# Patient Record
Sex: MALE | Race: BLACK OR AFRICAN AMERICAN | NOT HISPANIC OR LATINO | Employment: UNEMPLOYED | URBAN - METROPOLITAN AREA
[De-identification: names, ages, dates, MRNs, and addresses within clinical notes are randomized per-mention and may not be internally consistent; named-entity substitution may affect disease eponyms.]

---

## 2021-09-25 ENCOUNTER — APPOINTMENT (OUTPATIENT)
Dept: CT IMAGING | Facility: CLINIC | Age: 56
End: 2021-09-25
Attending: EMERGENCY MEDICINE

## 2021-09-25 ENCOUNTER — APPOINTMENT (OUTPATIENT)
Dept: GENERAL RADIOLOGY | Facility: CLINIC | Age: 56
End: 2021-09-25
Attending: EMERGENCY MEDICINE

## 2021-09-25 ENCOUNTER — HOSPITAL ENCOUNTER (EMERGENCY)
Facility: CLINIC | Age: 56
Discharge: HOME | End: 2021-09-25
Attending: EMERGENCY MEDICINE | Admitting: EMERGENCY MEDICINE

## 2021-09-25 VITALS
HEART RATE: 76 BPM | SYSTOLIC BLOOD PRESSURE: 140 MMHG | RESPIRATION RATE: 16 BRPM | WEIGHT: 315 LBS | OXYGEN SATURATION: 99 % | TEMPERATURE: 97.2 F | DIASTOLIC BLOOD PRESSURE: 93 MMHG | BODY MASS INDEX: 45.33 KG/M2

## 2021-09-25 DIAGNOSIS — I10 HYPERTENSION, POOR CONTROL: ICD-10-CM

## 2021-09-25 LAB
ANION GAP SERPL CALCULATED.3IONS-SCNC: 7 MMOL/L (ref 3–14)
BASOPHILS # BLD AUTO: 0 10E3/UL (ref 0–0.2)
BASOPHILS NFR BLD AUTO: 1 %
BUN SERPL-MCNC: 13 MG/DL (ref 7–30)
CALCIUM SERPL-MCNC: 8.8 MG/DL (ref 8.5–10.1)
CHLORIDE BLD-SCNC: 103 MMOL/L (ref 94–109)
CO2 SERPL-SCNC: 29 MMOL/L (ref 20–32)
CREAT SERPL-MCNC: 1.42 MG/DL (ref 0.66–1.25)
D DIMER PPP FEU-MCNC: 0.81 UG/ML FEU (ref 0–0.5)
EOSINOPHIL # BLD AUTO: 0.1 10E3/UL (ref 0–0.7)
EOSINOPHIL NFR BLD AUTO: 1 %
ERYTHROCYTE [DISTWIDTH] IN BLOOD BY AUTOMATED COUNT: 13.8 % (ref 10–15)
GFR SERPL CREATININE-BSD FRML MDRD: 55 ML/MIN/1.73M2
GLUCOSE BLD-MCNC: 99 MG/DL (ref 70–99)
HCT VFR BLD AUTO: 47.7 % (ref 40–53)
HGB BLD-MCNC: 14.9 G/DL (ref 13.3–17.7)
HOLD SPECIMEN: NORMAL
IMM GRANULOCYTES # BLD: 0 10E3/UL
IMM GRANULOCYTES NFR BLD: 0 %
LYMPHOCYTES # BLD AUTO: 1.8 10E3/UL (ref 0.8–5.3)
LYMPHOCYTES NFR BLD AUTO: 32 %
MCH RBC QN AUTO: 27.4 PG (ref 26.5–33)
MCHC RBC AUTO-ENTMCNC: 31.2 G/DL (ref 31.5–36.5)
MCV RBC AUTO: 88 FL (ref 78–100)
MONOCYTES # BLD AUTO: 0.4 10E3/UL (ref 0–1.3)
MONOCYTES NFR BLD AUTO: 8 %
NEUTROPHILS # BLD AUTO: 3.1 10E3/UL (ref 1.6–8.3)
NEUTROPHILS NFR BLD AUTO: 58 %
NRBC # BLD AUTO: 0 10E3/UL
NRBC BLD AUTO-RTO: 0 /100
PLATELET # BLD AUTO: 248 10E3/UL (ref 150–450)
POTASSIUM BLD-SCNC: 4 MMOL/L (ref 3.4–5.3)
RBC # BLD AUTO: 5.43 10E6/UL (ref 4.4–5.9)
SODIUM SERPL-SCNC: 139 MMOL/L (ref 133–144)
TROPONIN I SERPL-MCNC: <0.015 UG/L (ref 0–0.04)
WBC # BLD AUTO: 5.4 10E3/UL (ref 4–11)

## 2021-09-25 PROCEDURE — 71275 CT ANGIOGRAPHY CHEST: CPT

## 2021-09-25 PROCEDURE — 71046 X-RAY EXAM CHEST 2 VIEWS: CPT

## 2021-09-25 PROCEDURE — 250N000013 HC RX MED GY IP 250 OP 250 PS 637: Performed by: EMERGENCY MEDICINE

## 2021-09-25 PROCEDURE — 250N000011 HC RX IP 250 OP 636: Performed by: EMERGENCY MEDICINE

## 2021-09-25 PROCEDURE — 93005 ELECTROCARDIOGRAM TRACING: CPT

## 2021-09-25 PROCEDURE — 85379 FIBRIN DEGRADATION QUANT: CPT | Performed by: EMERGENCY MEDICINE

## 2021-09-25 PROCEDURE — 85025 COMPLETE CBC W/AUTO DIFF WBC: CPT | Performed by: EMERGENCY MEDICINE

## 2021-09-25 PROCEDURE — 99285 EMERGENCY DEPT VISIT HI MDM: CPT | Mod: 25

## 2021-09-25 PROCEDURE — 84484 ASSAY OF TROPONIN QUANT: CPT | Performed by: EMERGENCY MEDICINE

## 2021-09-25 PROCEDURE — 80048 BASIC METABOLIC PNL TOTAL CA: CPT | Performed by: EMERGENCY MEDICINE

## 2021-09-25 PROCEDURE — 36415 COLL VENOUS BLD VENIPUNCTURE: CPT | Performed by: EMERGENCY MEDICINE

## 2021-09-25 PROCEDURE — 96374 THER/PROPH/DIAG INJ IV PUSH: CPT | Mod: 59

## 2021-09-25 RX ORDER — AMLODIPINE BESYLATE 5 MG/1
5 TABLET ORAL ONCE
Status: COMPLETED | OUTPATIENT
Start: 2021-09-25 | End: 2021-09-25

## 2021-09-25 RX ORDER — ASPIRIN 325 MG
325 TABLET ORAL ONCE
Status: COMPLETED | OUTPATIENT
Start: 2021-09-25 | End: 2021-09-25

## 2021-09-25 RX ORDER — LOSARTAN POTASSIUM 25 MG/1
25 TABLET ORAL DAILY
Qty: 30 TABLET | Refills: 1 | Status: SHIPPED | OUTPATIENT
Start: 2021-09-25 | End: 2022-04-22

## 2021-09-25 RX ORDER — LABETALOL HYDROCHLORIDE 5 MG/ML
20 INJECTION, SOLUTION INTRAVENOUS EVERY 10 MIN PRN
Status: DISCONTINUED | OUTPATIENT
Start: 2021-09-25 | End: 2021-09-25 | Stop reason: HOSPADM

## 2021-09-25 RX ORDER — LISINOPRIL 10 MG/1
10 TABLET ORAL ONCE
Status: COMPLETED | OUTPATIENT
Start: 2021-09-25 | End: 2021-09-25

## 2021-09-25 RX ORDER — IOPAMIDOL 755 MG/ML
100 INJECTION, SOLUTION INTRAVASCULAR ONCE
Status: COMPLETED | OUTPATIENT
Start: 2021-09-25 | End: 2021-09-25

## 2021-09-25 RX ORDER — AMLODIPINE BESYLATE 5 MG/1
5 TABLET ORAL DAILY
Qty: 30 TABLET | Refills: 1 | Status: SHIPPED | OUTPATIENT
Start: 2021-09-25 | End: 2022-04-22

## 2021-09-25 RX ORDER — MECLIZINE HYDROCHLORIDE 25 MG/1
25 TABLET ORAL ONCE
Status: COMPLETED | OUTPATIENT
Start: 2021-09-25 | End: 2021-09-25

## 2021-09-25 RX ADMIN — MECLIZINE HYDROCHLORIDE 25 MG: 25 TABLET ORAL at 08:37

## 2021-09-25 RX ADMIN — IOPAMIDOL 100 ML: 755 INJECTION, SOLUTION INTRAVENOUS at 10:30

## 2021-09-25 RX ADMIN — LISINOPRIL 10 MG: 10 TABLET ORAL at 10:14

## 2021-09-25 RX ADMIN — AMLODIPINE BESYLATE 5 MG: 5 TABLET ORAL at 10:14

## 2021-09-25 RX ADMIN — LABETALOL HYDROCHLORIDE 20 MG: 5 INJECTION, SOLUTION INTRAVENOUS at 11:26

## 2021-09-25 RX ADMIN — ASPIRIN 325 MG ORAL TABLET 325 MG: 325 PILL ORAL at 08:37

## 2021-09-25 ASSESSMENT — ENCOUNTER SYMPTOMS
RHINORRHEA: 0
PALPITATIONS: 1
DIZZINESS: 1
FEVER: 0
COUGH: 0
CHEST TIGHTNESS: 1

## 2021-09-25 NOTE — ED PROVIDER NOTES
History   Chief Complaint:  Chest Pain and Dizziness       The history is provided by the patient.      Efraín Estrada is a 55 year old male with a history of hypertension and hyperlipidemia who presents with chest discomfort patient describes as a stuffiness that began last night. Denies having any outright chest pain. The patient also had symptoms of dizziness that he likened to vertigo, particularly when he went to sit or stand up. His dizziness persisted all through last night though is resolved here, does continue to have chest stuffiness. He is not in as much discomfort here at rest though notes that symptoms are exacerbated with movement. The patient reports that he was seen in the ED back in November 2015 for dizziness and chest congestion. His symptoms at that time were very similar to today's presentation though were more severe, he also felt very clammy and had elevated BP. He was started on both lisinopril and meclizine at that time but notes that he is no longer taking either medication. No current regular medications. Patient does record BP at home, has been running  ~145/. He has known history of hypertension but does not regularly follow with a provider for this. Patient is also concerned that he has been feeling the sensation of palpitations over the last few months. No family history of heart problems that he is aware. No cough, rhinorrhea, or fever. Patient does not currently smoke tobacco though did recently quit as of ~2 weeks ago.    Review of Systems   Constitutional: Negative for fever.   HENT: Negative for rhinorrhea.    Respiratory: Positive for chest tightness (stuffiness). Negative for cough.    Cardiovascular: Positive for palpitations (per patient). Negative for chest pain.   Neurological: Positive for dizziness (since resolved).   All other systems reviewed and are negative.    Allergies:  Codeine    Medications:  The patient is not currently taking any regular  medications    Past Medical History:    Hypertension  Hyperlipidemia     Past Surgical History:    Tibia fracture repair with plate placement  Plate removal from tibia  Tonsillectomy    Family History:    Father - stomach cancer  Mother - ovarian cancer    Social History:  The patient was not accompanied to the ED.  Smoking Status: Former smoker, quit ~ 2 weeks ago. Started smoking at the age of 21.  Alcohol Use: Positive  Drug Use: Negative    Physical Exam     Patient Vitals for the past 24 hrs:   BP Temp Pulse Resp SpO2 Weight   09/25/21 1145 (!) 146/95 -- 72 -- 98 % --   09/25/21 1130 139/88 -- 74 -- 99 % --   09/25/21 1118 (!) 190/119 -- 85 -- 100 % --   09/25/21 1116 (!) 178/110 -- 82 -- 100 % --   09/25/21 1027 -- -- -- -- -- (!) 151.6 kg (334 lb 3.5 oz)   09/25/21 1015 -- -- 73 16 99 % --   09/25/21 1000 (!) 170/125 -- 78 -- -- --   09/25/21 0900 (!) 162/107 -- 74 23 99 % --   09/25/21 0850 -- -- 78 13 100 % --   09/25/21 0845 (!) 166/112 -- 78 19 100 % --   09/25/21 0830 (!) 197/120 -- -- -- -- --   09/25/21 0824 (!) 201/122 97.2  F (36.2  C) 89 24 99 % --       Physical Exam  General: Patient is alert and cooperative.  HENT:  Normal nose, oropharynx. Moist oral mucosa.  Eyes: EOMI. Normal conjunctiva.  Neck:  Normal range of motion and appearance.   Cardiovascular:  Normal rate, regular rhythm and normal heart sounds.   Pulmonary/Chest:  Effort normal. No wheezing or crackles.  Abdominal: Soft. No distension or tenderness.     Musculoskeletal: Normal range of motion. No edema or tenderness.   Neurological: oriented, normal strength, sensation, and coordination.   Skin: Warm and dry. No rash or bruising.   Psychiatric: Normal mood and affect. Normal behavior and judgement.    Emergency Department Course     ECG:  Indication: Chest discomfort  Completed at 0832.  Read at 0835.   Normal sinus rhythm   Rate 92 bpm. RI interval 172. QRS duration 94. QT/QTc 358/442. P-R-T axes 63 14 51.  Agree with computer  interpretation.     Imaging:  XR Chest 2 Views:  Negative chest.  Report per radiology.    CT Chest Pulmonary Embolism w Contrast:  1.  No acute intrathoracic process demonstrated.  Report per radiology.    Laboratory:  CBC: WBC 5.4, HGB 14.9,   BMP: GFR estimate 55 (L), Creatinine 1.42 (H), o/w WNL    D dimer: 0.81 (H)    Troponin (Collected 0834): <0.015    Emergency Department Course:    Reviewed:  I reviewed the patient's nursing notes, vitals, past medical history and care everywhere.     Assessments:  0828 I performed an exam of the patient in room 11 as documented above.  1155 Patient rechecked and updated.      Interventions:  0837 Aspirin 325 mg PO  0837 Meclizine 25 mg PO  1014 Zestril 10 mg PO  1014 Norvasc 5 mg PO  1126 Labetalol 20 mg IV    Disposition:  The patient was discharged to home.     Impression & Plan     Medical Decision Making:  Efraín Estrada is a 55 year old male who presents with complaints of mild vague chest stuffiness and vertigo.  No fever, URI symptom, cough, or chest pain.  Normal exam.  Hx hypertension, non compliant with medications.  Hx vertigo.  BP persistently elevated on arrival.  W/u reassuring, including ekg wnl, neg trop, minimally elevated d-dimer with subsequent neg CT chest.  No concern for covid.  Treated initially with asa, meclizine.  Then lisinopril 10 mg, norvasc 5 mg, finally labetalol 20 mg IV, BP trending downward.  Results discussed.  No evidence or concern for ischemia or infection.  Rec re starting losartan, amlodipine, BP diary, early outpatient follow up.      Diagnosis:    ICD-10-CM    1. Hypertension, poor control  I10        Discharge Medications:   New Prescriptions    AMLODIPINE (NORVASC) 5 MG TABLET    Take 1 tablet (5 mg) by mouth daily    LOSARTAN (COZAAR) 25 MG TABLET    Take 1 tablet (25 mg) by mouth daily       Scribe Disclosure:  Dasia SNYDER, am serving as a scribe at 8:28 AM on 9/25/2021 to document services personally  performed by Matthew Reyes MD based on my observations and the provider's statements to me.     This note was completed in part using Dragon voice recognition software. Although reviewed after completion, some word and grammatical errors may occur.     Dasia Thompson  9/25/2021   [facility] EMERGENCY DEPARTMENT         Matthew Reyes MD  09/25/21 [id number]

## 2021-09-25 NOTE — ED TRIAGE NOTES
Pt arrives with c/o sudden onset of vertigo and chest tightness last night. Pt endorses hx of vertigo. Pt rates tightness at 4/10. ABCs intact.

## 2021-09-27 LAB
ATRIAL RATE - MUSE: 92 BPM
DIASTOLIC BLOOD PRESSURE - MUSE: NORMAL MMHG
INTERPRETATION ECG - MUSE: NORMAL
P AXIS - MUSE: 63 DEGREES
PR INTERVAL - MUSE: 172 MS
QRS DURATION - MUSE: 94 MS
QT - MUSE: 358 MS
QTC - MUSE: 442 MS
R AXIS - MUSE: 14 DEGREES
SYSTOLIC BLOOD PRESSURE - MUSE: NORMAL MMHG
T AXIS - MUSE: 51 DEGREES
VENTRICULAR RATE- MUSE: 92 BPM

## 2021-11-13 ENCOUNTER — HEALTH MAINTENANCE LETTER (OUTPATIENT)
Age: 56
End: 2021-11-13

## 2022-04-21 ENCOUNTER — NURSE TRIAGE (OUTPATIENT)
Dept: INTERNAL MEDICINE | Facility: CLINIC | Age: 57
End: 2022-04-21
Payer: MEDICAID

## 2022-04-21 NOTE — TELEPHONE ENCOUNTER
Nurse Triage SBAR    Is this a 2nd Level Triage? NO    Situation: Patient calls to schedule appointment to reestablish care and restart blood pressure medication. Transferred as high priority call to this RN.    Background: Health Hx of HTN. Previously on Amlodipine 5 mg, Losartan 25 mg. Patient had a change of insurance and did not have health insurance until beginning of April.    Assessment: Patient calls reporting elevated blood pressure. Last night, BP of 171/105. Today at 0830- 155/100.     Protocol Recommended Disposition:   See Within 3 Days In Office     Recommendation: Given to central scheduling       Does the patient meet one of the following criteria for ADS visit consideration? 16+ years old, with an [facility] PCP     TIP  Providers, please consider if this condition is appropriate for management at one of our Acute and Diagnostic Services sites.     If patient is a good candidate, please use dotphrase <dot>triageresponse and select Refer to ADS to document.    Reason for Disposition    Systolic BP >= 160 OR Diastolic >= 100    Additional Information    Negative: Sounds like a life-threatening emergency to the triager    Negative: Pregnant > 20 weeks or postpartum (< 6 weeks after delivery) and new hand or face swelling    Negative: Pregnant > 20 weeks and BP > 140/90    Negative: BP Systolic BP >= 140 OR Diastolic >= 90 and postpartum (from 0 to 6 weeks after delivery)    Negative: Systolic BP >= 180 OR Diastolic >= 110, and missed most recent dose of blood pressure medication    Negative: Systolic BP >= 160 OR Diastolic >= 100, and any cardiac or neurologic symptoms (e.g., chest pain, difficulty breathing, unsteady gait, blurred vision)    Negative: Patient sounds very sick or weak to the triager    Negative: Ran out of BP medications    Negative: Taking BP medications and feels is having side effects (e.g., impotence, cough, dizziness)    Negative: Systolic BP >= 180 OR Diastolic >= 110    Negative:  "Patient wants to be seen    Answer Assessment - Initial Assessment Questions  1. BLOOD PRESSURE: \"What is the blood pressure?\" \"Did you take at least two measurements 5 minutes apart?\"      Last bp reading 155/100  2. ONSET: \"When did you take your blood pressure?\"      This morning, 0830  3. HOW: \"How did you obtain the blood pressure?\" (e.g., visiting nurse, automatic home BP monitor)      Home BP  4. HISTORY: \"Do you have a history of high blood pressure?\"      Hx of high bp  5. MEDICATIONS: \"Are you taking any medications for blood pressure?\" \"Have you missed any doses recently?\"      No- lost health coverage and didn't have care. Was on Amlodipine 5mg and Losartan 25 mg  6. OTHER SYMPTOMS: \"Do you have any symptoms?\" (e.g., headache, chest pain, blurred vision, difficulty breathing, weakness)      No  7. PREGNANCY: \"Is there any chance you are pregnant?\" \"When was your last menstrual period?\"      No    Protocols used: HIGH BLOOD PRESSURE-A-OH      "

## 2022-04-22 ENCOUNTER — OFFICE VISIT (OUTPATIENT)
Dept: INTERNAL MEDICINE | Facility: CLINIC | Age: 57
End: 2022-04-22
Payer: MEDICAID

## 2022-04-22 VITALS
DIASTOLIC BLOOD PRESSURE: 92 MMHG | TEMPERATURE: 97.9 F | SYSTOLIC BLOOD PRESSURE: 162 MMHG | RESPIRATION RATE: 16 BRPM | WEIGHT: 302 LBS | HEIGHT: 72 IN | BODY MASS INDEX: 40.9 KG/M2 | OXYGEN SATURATION: 97 % | HEART RATE: 89 BPM

## 2022-04-22 DIAGNOSIS — I10 HYPERTENSION, UNSPECIFIED TYPE: ICD-10-CM

## 2022-04-22 DIAGNOSIS — Z13.1 SCREENING FOR DIABETES MELLITUS: ICD-10-CM

## 2022-04-22 DIAGNOSIS — Z13.6 CARDIOVASCULAR SCREENING; LDL GOAL LESS THAN 100: ICD-10-CM

## 2022-04-22 DIAGNOSIS — Z23 HIGH PRIORITY FOR 2019-NCOV VACCINE: ICD-10-CM

## 2022-04-22 DIAGNOSIS — G47.33 OSA (OBSTRUCTIVE SLEEP APNEA): ICD-10-CM

## 2022-04-22 DIAGNOSIS — E66.01 MORBID OBESITY (H): ICD-10-CM

## 2022-04-22 DIAGNOSIS — Z12.5 SCREENING FOR PROSTATE CANCER: ICD-10-CM

## 2022-04-22 DIAGNOSIS — Z11.59 NEED FOR HEPATITIS C SCREENING TEST: ICD-10-CM

## 2022-04-22 PROCEDURE — 99204 OFFICE O/P NEW MOD 45 MIN: CPT | Performed by: INTERNAL MEDICINE

## 2022-04-22 PROCEDURE — 91306 COVID-19,PF,MODERNA (18+ YRS BOOSTER .25ML): CPT | Performed by: INTERNAL MEDICINE

## 2022-04-22 PROCEDURE — 0064A COVID-19,PF,MODERNA (18+ YRS BOOSTER .25ML): CPT | Performed by: INTERNAL MEDICINE

## 2022-04-22 RX ORDER — LOSARTAN POTASSIUM AND HYDROCHLOROTHIAZIDE 12.5; 1 MG/1; MG/1
1 TABLET ORAL DAILY
Qty: 30 TABLET | Refills: 2 | Status: SHIPPED | OUTPATIENT
Start: 2022-04-22 | End: 2022-05-23 | Stop reason: DRUGHIGH

## 2022-04-22 RX ORDER — AMLODIPINE BESYLATE 5 MG/1
5 TABLET ORAL DAILY
Qty: 30 TABLET | Refills: 2 | Status: SHIPPED | OUTPATIENT
Start: 2022-04-22 | End: 2022-05-23 | Stop reason: DRUGHIGH

## 2022-04-22 NOTE — PROGRESS NOTES
ASSESSMENT:   1. Hypertension, unspecified type  Uncontrolled.  We will start losartan/hydrochlorothiazide and amlodipine which have worked well for the patient to control blood pressure in the past per review of chart.  Blood pressure recheck 1 month.  Labs as ordered  - losartan-hydrochlorothiazide (HYZAAR) 100-12.5 MG tablet; Take 1 tablet by mouth daily  Dispense: 30 tablet; Refill: 2  - amLODIPine (NORVASC) 5 MG tablet; Take 1 tablet (5 mg) by mouth daily  Dispense: 30 tablet; Refill: 2  - Comprehensive metabolic panel; Future  - CBC with platelets; Future    2. Morbid obesity (H)  32 pound weight loss compared to last Fall with diet and exercise.  Counseled regarding further calorie/carbohydrate reduction and increase in exercise for further weight loss.  Contributing hypertension, sleep apnea     3. NEGIN (obstructive sleep apnea)  Untreated.  Likely contributing to hypertension.  Patient to see sleep clinic for CPAP therapy.  Has daytime fatigue  - Adult Sleep Eval & Management  Referral; Future  - TSH with free T4 reflex; Future  - CBC with platelets; Future    4. Need for hepatitis C screening test  Candidate for screening  - Hepatitis C antibody; Future    5. High priority for 2019-nCoV vaccine  Overdue for booster vaccination  - COVID-19,PF,MODERNA (18+ Yrs BOOSTER .25mL)    6. Screening for prostate cancer  - Prostate Specific Antigen Screen; Future    7. Screening for diabetes mellitus  At risk for diabetes with history of obesity.  Screening labs ordered  - Comprehensive metabolic panel; Future    8. CARDIOVASCULAR SCREENING; LDL GOAL LESS THAN 100  At risk for hyperlipidemia with obesity.  Screening labs ordered  - Lipid panel reflex to direct LDL Fasting; Future      PLAN:   Amlodipine 5mg tab, 1 tab daily in PM with supper for blood pressure   Losartan /hydrochlorathiazide 100.12.5mg tab,  1 tab  daily  In  AM for blood pressure   Fasting lab appt in 2 weeks.   For fasting labs, please  refrain from eating for 8 hours or more.   Drink 2 glasses of water before your lab appointment. It is fine to take your  oral medications on the morning of the lab test as usual  See me in 1 month for follow-up of blood pressure   Reduce calorie/carbohydrate (sugar, bread, potato, pasta, rice, alcohol etc)  intake in diet.  Increase color on your plate with fruits and vegetables. Increase  frequency of walking or other aerobic exercise as able (goal is daily)  Moderna  covid booster vaccine today   Referral  to  Sleep clinic re: obstructive sleep apnea.  They will call  To schedule  Will address other healthcare maintenance issues (colonoscopy, etc.) at follow-up appt after blood pressure more stable        (Chart documentation was completed, in part, with [employer] voice-recognition software. Even though reviewed, some grammatical, spelling, and word errors may remain.)    Carlos Alberto Hunter MD  Internal Medicine Department  [facility] HospitalMIREILLE Kenny is a 56 year old who presents for the following health issues     History of Present Illness       Hypertension: He presents for follow up of hypertension.  He does check blood pressure  regularly outside of the clinic. Outside blood pressures have been over 140/90. He follows a low salt diet.     He eats 2-3 servings of fruits and vegetables daily.He consumes 1 sweetened beverage(s) daily.He exercises with enough effort to increase his heart rate 20 to 29 minutes per day.  He exercises with enough effort to increase his heart rate 4 days per week. He is missing 7 dose(s) of medications per week.  He is not taking prescribed medications regularly due to other.      Most recent lab results reviewed with pt.      The patient for the first time today.  Previous history of hypertension treatment at previous clinic.  Patient then had been out of insurance for a period of time not taking medication.  History of sleep apnea.  Patient  states he uses CPAP back in 2006.  Not on any therapy currently.  Has daytime fatigue  Denies chest pain, shortness of breath, abdominal pain, headache, vision changes   Patient exercising.  Has lost 32 pounds since being seen in the emergency room last September.  Rides a mountain bike         Additional ROS:   Constitutional, HEENT, Cardiovascular, Pulmonary, GI and , Neuro, MSK and Psych review of systems/symptoms are otherwise negative or unchanged from previous, except as noted above.      OBJECTIVE:  BP (!) 162/92   Pulse 89   Temp 97.9  F (36.6  C) (Temporal)   Resp 16   Ht 1.829 m (6')   Wt 137 kg (302 lb)   SpO2 97%   BMI 40.96 kg/m     Estimated body mass index is 40.96 kg/m  as calculated from the following:    Height as of this encounter: 1.829 m (6').    Weight as of this encounter: 137 kg (302 lb).  Eye: PERRL, EOMI  HENT: ear canals and TM's normal and nose and mouth without ulcers or lesions.  Narrowed posterior pharyngeal airway due to obesity  Neck: no adenopathy. Thyroid normal to palpation. No bruits  Pulm: Lungs clear to auscultation   CV: Regular rates and rhythm  GI: Soft, obese, nontender, Normal active bowel sounds, No hepatosplenomegaly or masses palpable  Ext: Peripheral pulses intact. No edema.  Neuro: Normal strength and tone, sensory exam grossly normal

## 2022-04-24 PROBLEM — E66.01 MORBID OBESITY (H): Status: ACTIVE | Noted: 2022-04-24

## 2022-04-24 PROBLEM — G47.33 OSA (OBSTRUCTIVE SLEEP APNEA): Status: ACTIVE | Noted: 2022-04-24

## 2022-05-06 ENCOUNTER — LAB (OUTPATIENT)
Dept: LAB | Facility: CLINIC | Age: 57
End: 2022-05-06
Payer: MEDICAID

## 2022-05-06 DIAGNOSIS — Z13.1 SCREENING FOR DIABETES MELLITUS: ICD-10-CM

## 2022-05-06 DIAGNOSIS — Z12.5 SCREENING FOR PROSTATE CANCER: ICD-10-CM

## 2022-05-06 DIAGNOSIS — Z11.59 NEED FOR HEPATITIS C SCREENING TEST: ICD-10-CM

## 2022-05-06 DIAGNOSIS — I10 HYPERTENSION, UNSPECIFIED TYPE: ICD-10-CM

## 2022-05-06 DIAGNOSIS — G47.33 OSA (OBSTRUCTIVE SLEEP APNEA): ICD-10-CM

## 2022-05-06 DIAGNOSIS — Z13.6 CARDIOVASCULAR SCREENING; LDL GOAL LESS THAN 100: ICD-10-CM

## 2022-05-06 LAB
ALBUMIN SERPL-MCNC: 4.2 G/DL (ref 3.4–5)
ALP SERPL-CCNC: 86 U/L (ref 40–150)
ALT SERPL W P-5'-P-CCNC: 29 U/L (ref 0–70)
ANION GAP SERPL CALCULATED.3IONS-SCNC: 6 MMOL/L (ref 3–14)
AST SERPL W P-5'-P-CCNC: 17 U/L (ref 0–45)
BILIRUB SERPL-MCNC: 0.4 MG/DL (ref 0.2–1.3)
BUN SERPL-MCNC: 22 MG/DL (ref 7–30)
CALCIUM SERPL-MCNC: 9.8 MG/DL (ref 8.5–10.1)
CHLORIDE BLD-SCNC: 104 MMOL/L (ref 94–109)
CHOLEST SERPL-MCNC: 179 MG/DL
CO2 SERPL-SCNC: 27 MMOL/L (ref 20–32)
CREAT SERPL-MCNC: 1.29 MG/DL (ref 0.66–1.25)
ERYTHROCYTE [DISTWIDTH] IN BLOOD BY AUTOMATED COUNT: 13.5 % (ref 10–15)
FASTING STATUS PATIENT QL REPORTED: YES
GFR SERPL CREATININE-BSD FRML MDRD: 65 ML/MIN/1.73M2
GLUCOSE BLD-MCNC: 100 MG/DL (ref 70–99)
HCT VFR BLD AUTO: 47.6 % (ref 40–53)
HDLC SERPL-MCNC: 59 MG/DL
HGB BLD-MCNC: 14.9 G/DL (ref 13.3–17.7)
LDLC SERPL CALC-MCNC: 110 MG/DL
MCH RBC QN AUTO: 26.7 PG (ref 26.5–33)
MCHC RBC AUTO-ENTMCNC: 31.3 G/DL (ref 31.5–36.5)
MCV RBC AUTO: 85 FL (ref 78–100)
NONHDLC SERPL-MCNC: 120 MG/DL
PLATELET # BLD AUTO: 233 10E3/UL (ref 150–450)
POTASSIUM BLD-SCNC: 4.5 MMOL/L (ref 3.4–5.3)
PROT SERPL-MCNC: 8.2 G/DL (ref 6.8–8.8)
PSA SERPL-MCNC: 4.6 UG/L (ref 0–4)
RBC # BLD AUTO: 5.59 10E6/UL (ref 4.4–5.9)
SODIUM SERPL-SCNC: 137 MMOL/L (ref 133–144)
TRIGL SERPL-MCNC: 49 MG/DL
TSH SERPL DL<=0.005 MIU/L-ACNC: 0.9 MU/L (ref 0.4–4)
WBC # BLD AUTO: 4.7 10E3/UL (ref 4–11)

## 2022-05-06 PROCEDURE — 86803 HEPATITIS C AB TEST: CPT

## 2022-05-06 PROCEDURE — 36415 COLL VENOUS BLD VENIPUNCTURE: CPT

## 2022-05-06 PROCEDURE — G0103 PSA SCREENING: HCPCS

## 2022-05-06 PROCEDURE — 80053 COMPREHEN METABOLIC PANEL: CPT

## 2022-05-06 PROCEDURE — 84443 ASSAY THYROID STIM HORMONE: CPT

## 2022-05-06 PROCEDURE — 85027 COMPLETE CBC AUTOMATED: CPT

## 2022-05-06 PROCEDURE — 80061 LIPID PANEL: CPT

## 2022-05-07 LAB — HCV AB SERPL QL IA: NONREACTIVE

## 2022-05-23 ENCOUNTER — OFFICE VISIT (OUTPATIENT)
Dept: INTERNAL MEDICINE | Facility: CLINIC | Age: 57
End: 2022-05-23
Payer: MEDICAID

## 2022-05-23 VITALS
OXYGEN SATURATION: 96 % | TEMPERATURE: 97.7 F | HEIGHT: 72 IN | DIASTOLIC BLOOD PRESSURE: 90 MMHG | RESPIRATION RATE: 16 BRPM | SYSTOLIC BLOOD PRESSURE: 136 MMHG | BODY MASS INDEX: 40.36 KG/M2 | HEART RATE: 78 BPM | WEIGHT: 298 LBS

## 2022-05-23 DIAGNOSIS — Z12.11 SCREEN FOR COLON CANCER: ICD-10-CM

## 2022-05-23 DIAGNOSIS — R97.20 ELEVATED PROSTATE SPECIFIC ANTIGEN (PSA): ICD-10-CM

## 2022-05-23 DIAGNOSIS — E66.01 MORBID OBESITY (H): ICD-10-CM

## 2022-05-23 DIAGNOSIS — I10 HYPERTENSION, UNSPECIFIED TYPE: Primary | ICD-10-CM

## 2022-05-23 DIAGNOSIS — G47.33 OSA (OBSTRUCTIVE SLEEP APNEA): ICD-10-CM

## 2022-05-23 PROCEDURE — 99214 OFFICE O/P EST MOD 30 MIN: CPT | Performed by: INTERNAL MEDICINE

## 2022-05-23 RX ORDER — LOSARTAN POTASSIUM AND HYDROCHLOROTHIAZIDE 25; 100 MG/1; MG/1
1 TABLET ORAL DAILY
Qty: 30 TABLET | Refills: 11 | Status: SHIPPED | OUTPATIENT
Start: 2022-05-23 | End: 2023-05-17

## 2022-05-23 RX ORDER — AMLODIPINE BESYLATE 10 MG/1
10 TABLET ORAL DAILY
Qty: 30 TABLET | Refills: 11 | Status: SHIPPED | OUTPATIENT
Start: 2022-05-23 | End: 2023-05-17

## 2022-05-23 NOTE — PATIENT INSTRUCTIONS
Increase Amlodipine to 5mg tab, 2 tabs (10mg) daily until  run  out.  Then change to 10mg tab,  1 tab  daily   in  PM for blood pressure   Change Losartan/hydrochlorathiazide to 100/25mg  tab, 1 tab  daily in AM  for blood pressure    Go to [email or web address] to schedule a free blood pressure recheck  appointment at the [facility] in a week and ask them to route the results to  me  Call  [phone] or use [employer] to schedule a future lab appointment  non-fasting in  eary July   May be done at [facility] or [facility] lab  See sleep clinic in  August as scheduled   Continue diet/exercise  Screening colonoscopy  with  [facility]. Call  them at [phone] to schedule the procedure.     Will fax visit report from today  to :  Debora SCHMITT)  [facility]   Tele [phone]   Fax [phone]

## 2022-05-23 NOTE — PROGRESS NOTES
ASSESSMENT:    1. Hypertension, unspecified type  Improved but would eventually like to see blood pressure < 130/80.  Will increase amlodipine and hydrochlorothiazide with same losartan dosage.  Patient will have blood pressure recheck through [place] pharmacy in a week.  Patient not having any headache/chest pain/shortness of breath with daily exertional exercise such as biking.  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  Feel patient should medically be able to be driving with current blood pressure reading which is expected to also drop further with medication adjustments  - losartan-hydrochlorothiazide (HYZAAR) 100-25 MG tablet; Take 1 tablet by mouth daily  Dispense: 30 tablet; Refill: 11  - amLODIPine (NORVASC) 10 MG tablet; Take 1 tablet (10 mg) by mouth daily  Dispense: 30 tablet; Refill: 11  - Basic metabolic panel; Future    2. Morbid obesity (H)  Weight continues to decrease with diet and exercise.  Contributing to hypertension, hyperlipidemia, sleep apnea       3. NEGIN (obstructive sleep apnea)  Untreated.  Has appointment in August to start therapy.  Treatment of sleep apnea will also likely lower blood pressure further    4. Elevated prostate specific antigen (PSA)  Prostate smooth on exam.  Nontender.  We will recheck PSA again in July  - Prostate Specific Antigen Screen; Future    5. Screen for colon cancer  Candidate for colon cancer screening.  Asymptomatic.  Colonoscopy ordered  - Adult Gastro Ref - Procedure Only; Future       PLAN:  Increase Amlodipine to 5mg tab, 2 tabs (10mg) daily until  run  out.  Then change to 10mg tab,  1 tab  daily   in  PM for blood pressure   Change Losartan/hydrochlorathiazide to 100/25mg  tab, 1 tab  daily in AM  for blood pressure    Go to [email or web address] to schedule a free blood pressure recheck  appointment at the [facility] in a week and ask them to route the results to  me  Call  [phone] or use [employer] to schedule a future lab  appointment  non-fasting in  eary July   May be done at [facility] or [facility] lab  See sleep clinic in  August as scheduled   Continue diet/exercise  Screening colonoscopy  with  [facility]. Call  them at [phone] to schedule the procedure.     Will fax visit report from today  to :  Debora SCHMITT)  [facility]   Tele [phone]   Fax [phone]      (Chart documentation was completed, in part, with [employer] voice-recognition software. Even though reviewed, some grammatical, spelling, and word errors may remain.)    Carlos Alberto Hunter MD  Internal Medicine Department  [facility] AMIRAH Kenny is a 56 year old who presents for the following health issues     History of Present Illness       Hypertension: He presents for follow up of hypertension.  He does check blood pressure  regularly outside of the clinic. Outside blood pressures have been over 140/90. He follows a low salt diet.        Most recent lab results reviewed with pt.      Component      Latest Ref Rng & Units 8/17/2006 11/13/2015 11/14/2015 9/25/2021   WBC      4.0 - 11.0 10e3/uL    5.4   RBC Count      4.40 - 5.90 10e6/uL    5.43   Hemoglobin      13.3 - 17.7 g/dL    14.9   Hematocrit      40.0 - 53.0 %    47.7   MCV      78 - 100 fL    88   MCH      26.5 - 33.0 pg    27.4   MCHC      31.5 - 36.5 g/dL    31.2 (L)   RDW      10.0 - 15.0 %    13.8   Platelet Count      150 - 450 10e3/uL    248   % Neutrophils      %    58   % Lymphocytes      %    32   % Monocytes      %    8   % Eosinophils      %    1   % Basophils      %    1   % Immature Granulocytes      %    0   NRBCs per 100 WBC      <1 /100    0   Absolute Neutrophils      1.6 - 8.3 10e3/uL    3.1   Absolute Lymphocytes      0.8 - 5.3 10e3/uL    1.8   Absolute Monocytes      0.0 - 1.3 10e3/uL    0.4   Absolute Eosinophils      0.0 - 0.7 10e3/uL    0.1   Absolute Basophils      0.0 - 0.2 10e3/uL    0.0   Absolute Immature  Granulocytes      <=0.0 10e3/uL    0.0   Absolute NRBCs      10e3/uL    0.0   Sodium      133 - 144 mmol/L  138  139   Potassium      3.4 - 5.3 mmol/L  3.8  4.0   Chloride      94 - 109 mmol/L  103  103   Carbon Dioxide      20 - 32 mmol/L  30  29   Anion Gap      3 - 14 mmol/L  5  7   Glucose      70 - 99 mg/dL  111 (H)  99   Urea Nitrogen      7 - 30 mg/dL  11  13   Creatinine      0.66 - 1.25 mg/dL  1.03  1.42 (H)   GFR Estimate      >60 mL/min/1.73m2  76  55 (L)   GFR Estimate If Black      >60 mL/min/1.7m2  >90 . . .     Calcium      8.5 - 10.1 mg/dL  8.7  8.8   Bilirubin Total      0.2 - 1.3 mg/dL  0.4     Albumin      3.4 - 5.0 g/dL  3.9     Protein Total      6.8 - 8.8 g/dL  7.9     Alkaline Phosphatase      40 - 150 U/L  86     ALT      0 - 70 U/L  30     AST      0 - 45 U/L  15     Alkaline Phosphatase      40 - 150 U/L       Cholesterol      <200 mg/dL   129    Triglycerides      <150 mg/dL   101    HDL Cholesterol      >=40 mg/dL   52    LDL Cholesterol Calculated      <=100 mg/dL   57    VLDL-Cholesterol      0 - 30 mg/dL   20    Cholesterol/HDL Ratio      0.0 - 5.0   2.5    Non HDL Cholesterol      <130 mg/dL       Patient Fasting > 8hrs?             PSA      0.00 - 4.00 ug/L 2.14      TSH      0.40 - 4.00 mU/L       Hepatitis C Antibody      Nonreactive         Component      Latest Ref Rng & Units 5/6/2022   WBC      4.0 - 11.0 10e3/uL 4.7   RBC Count      4.40 - 5.90 10e6/uL 5.59   Hemoglobin      13.3 - 17.7 g/dL 14.9   Hematocrit      40.0 - 53.0 % 47.6   MCV      78 - 100 fL 85   MCH      26.5 - 33.0 pg 26.7   MCHC      31.5 - 36.5 g/dL 31.3 (L)   RDW      10.0 - 15.0 % 13.5   Platelet Count      150 - 450 10e3/uL 233   % Neutrophils      %    % Lymphocytes      %    % Monocytes      %    % Eosinophils      %    % Basophils      %    % Immature Granulocytes      %    NRBCs per 100 WBC      <1 /100    Absolute Neutrophils      1.6 - 8.3 10e3/uL    Absolute Lymphocytes      0.8 - 5.3 10e3/uL     Absolute Monocytes      0.0 - 1.3 10e3/uL    Absolute Eosinophils      0.0 - 0.7 10e3/uL    Absolute Basophils      0.0 - 0.2 10e3/uL    Absolute Immature Granulocytes      <=0.0 10e3/uL    Absolute NRBCs      10e3/uL    Sodium      133 - 144 mmol/L 137   Potassium      3.4 - 5.3 mmol/L 4.5   Chloride      94 - 109 mmol/L 104   Carbon Dioxide      20 - 32 mmol/L 27   Anion Gap      3 - 14 mmol/L 6   Glucose      70 - 99 mg/dL 100 (H)   Urea Nitrogen      7 - 30 mg/dL 22   Creatinine      0.66 - 1.25 mg/dL 1.29 (H)   GFR Estimate      >60 mL/min/1.73m2 65   GFR Estimate If Black      >60 mL/min/1.7m2    Calcium      8.5 - 10.1 mg/dL 9.8   Bilirubin Total      0.2 - 1.3 mg/dL 0.4   Albumin      3.4 - 5.0 g/dL 4.2   Protein Total      6.8 - 8.8 g/dL 8.2   Alkaline Phosphatase      40 - 150 U/L    ALT      0 - 70 U/L 29   AST      0 - 45 U/L 17   Alkaline Phosphatase      40 - 150 U/L 86   Cholesterol      <200 mg/dL 179   Triglycerides      <150 mg/dL 49   HDL Cholesterol      >=40 mg/dL 59   LDL Cholesterol Calculated      <=100 mg/dL 110 (H)   VLDL-Cholesterol      0 - 30 mg/dL    Cholesterol/HDL Ratio      0.0 - 5.0    Non HDL Cholesterol      <130 mg/dL 120   Patient Fasting > 8hrs?       Yes   PSA      0.00 - 4.00 ug/L 4.60 (H)   TSH      0.40 - 4.00 mU/L 0.90   Hepatitis C Antibody      Nonreactive Nonreactive       Probable sleep apnea.  Has been referred to the [place] sleep clinic has an appointment in August.  Met patient once previously 1 month ago and started on amlodipine and losartan/hydrochlorothiazide for hypertension and blood pressure now quite improved but still remains above goal.  Weight down another 4 pounds from last visit.  History of morbid obesity.  Abnormal renal function but improved since starting hydrochlorothiazide and other blood pressure medicines as above.  LDL and glucose minimal elevation.   Denies chest pain, shortness of breath, abdominal pain, headache, vision changes or side  effects with medications.  Patient exercising daily.  Has appointment scheduled in August with the sleep clinic regarding starting treatment for sleep apnea.  Patient is currently not working.  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  Patient employed as a .  Patient states he has been working with Debora Em (NP) at [facility] and needs today's clinic note and current blood pressure faxed to that provider      No identified additional risks  The 10-year ASCVD risk score ([place] PADILLA Jr., et al., 2013) is: 6.6%    Values used to calculate the score:      Age: 56 years      Sex: Male      Is Non- : No      Diabetic: No      Tobacco smoker: No      Systolic Blood Pressure: 142 mmHg      Is BP treated: Yes      HDL Cholesterol: 59 mg/dL      Total Cholesterol: 179 mg/dL      Additional ROS:   Constitutional, HEENT, Cardiovascular, Pulmonary, GI and , Neuro, MSK and Psych review of systems/symptoms are otherwise negative or unchanged from previous, except as noted above.      OBJECTIVE:  BP (!) 136/90   Pulse 78   Temp 97.7  F (36.5  C) (Temporal)   Resp 16   Ht 1.829 m (6')   Wt 135.2 kg (298 lb)   SpO2 96%   BMI 40.42 kg/m     Estimated body mass index is 40.42 kg/m  as calculated from the following:    Height as of this encounter: 1.829 m (6').    Weight as of this encounter: 135.2 kg (298 lb).     Neck: no adenopathy. Thyroid normal to palpation. No bruits  Pulm: Lungs clear to auscultation   CV: Regular rates and rhythm  GI: Soft, obese, nontender, Normal active bowel sounds, No hepatosplenomegaly or masses palpable  Ext: Peripheral pulses intact. No edema.

## 2022-05-24 ENCOUNTER — TELEPHONE (OUTPATIENT)
Dept: FAMILY MEDICINE | Facility: CLINIC | Age: 57
End: 2022-05-24
Payer: MEDICAID

## 2022-08-16 ENCOUNTER — OFFICE VISIT (OUTPATIENT)
Dept: SLEEP MEDICINE | Facility: CLINIC | Age: 57
End: 2022-08-16
Attending: INTERNAL MEDICINE
Payer: MEDICAID

## 2022-08-16 VITALS
HEIGHT: 72 IN | BODY MASS INDEX: 40.23 KG/M2 | WEIGHT: 297 LBS | SYSTOLIC BLOOD PRESSURE: 134 MMHG | DIASTOLIC BLOOD PRESSURE: 88 MMHG | HEART RATE: 74 BPM | OXYGEN SATURATION: 98 %

## 2022-08-16 DIAGNOSIS — R29.818 SUSPECTED SLEEP APNEA: Primary | ICD-10-CM

## 2022-08-16 DIAGNOSIS — I10 PRIMARY HYPERTENSION: ICD-10-CM

## 2022-08-16 DIAGNOSIS — R06.81 WITNESSED APNEIC SPELLS: ICD-10-CM

## 2022-08-16 DIAGNOSIS — R06.83 SNORING: ICD-10-CM

## 2022-08-16 PROCEDURE — 99203 OFFICE O/P NEW LOW 30 MIN: CPT | Performed by: INTERNAL MEDICINE

## 2022-08-16 NOTE — NURSING NOTE
Chief Complaint   Patient presents with     Sleep Problem     Possible sleep apnea, stops breathing during sleep, wakes up gasping for air        Initial /88   Pulse 74   Ht 1.829 m (6')   Wt 134.7 kg (297 lb)   SpO2 98%   BMI 40.28 kg/m   Estimated body mass index is 40.28 kg/m  as calculated from the following:    Height as of this encounter: 1.829 m (6').    Weight as of this encounter: 134.7 kg (297 lb).    Medication Reconciliation: complete    Neck circumference:47 centimeters.    ESS:2    Nohemy Roberts CNA

## 2022-08-16 NOTE — PROGRESS NOTES
Sleep Consultation:    Date on this visit: 8/16/2022    Efraín Estrada  is referred by Carlos Alberto Hunter for a sleep consultation.     Primary Physician: No Ref-Primary, Physician     Efraín Estrada reports nightly snoring and poor quality of sleep for many years.     History is significant for hypertension and obesity.    Efraín does snore frequently. Patient has experienced snort arousals and gasping episodes in sleep. He does have witnessed apneas as reported to him by hi sex wife. Patient sleeps on his side. He denies no morning dry mouth, morning headaches and restless legs. Efraín denies any sleep walking, dream enactment, sleep paralysis and hypnogogic/hypnopompic hallucinations.    Efraín goes to sleep at 10:00 PM during the week. He wakes up at 7:00 AM. He falls asleep in 20 minutes.  Efraín denies difficulty falling asleep.  He wakes up 3-4 times a night for 30 minutes before falling back to sleep.  Efraín wakes up to go to the bathroom and uncertain reasons.  On weekends, Efraín goes to sleep at 10:30 PM.  He wakes up at 9:00 AM. He falls asleep in 30 minutes.  Patient gets an average of 6-7 hours of sleep per night.      Efraín does not do shift work.  He works as a  and has 11 AM- 6 PM shifts.      Patient's [place] Sleepiness score 2/24 consistent with no daytime sleepiness.      Efraín naps 0 times per week. He takes no inadvertant naps.  He denies closing eyes, dozing and falling asleep while driving. Patient was counseled on the importance of driving while alert, to pull over if drowsy, or nap before getting into the vehicle if sleepy.      He uses 0-1 sodas/day.       Allergies:    Allergies   Allergen Reactions     Codeine Rash     Patient says this was a long time ago, when he was 18     Lisinopril Cough       Medications:    Current Outpatient Medications   Medication Sig Dispense Refill     amLODIPine (NORVASC) 10 MG tablet Take 1 tablet (10 mg) by mouth daily 30 tablet 11      losartan-hydrochlorothiazide (HYZAAR) 100-25 MG tablet Take 1 tablet by mouth daily 30 tablet 11       Problem List:  Patient Active Problem List    Diagnosis Date Noted     Elevated prostate specific antigen (PSA) 2022     Priority: Medium     Morbid obesity (H) 2022     Priority: Medium     NEGIN (obstructive sleep apnea) 2022     Priority: Medium     HTN (hypertension)      Priority: Medium        Past Medical/Surgical History:  Past Medical History:   Diagnosis Date     HTN (hypertension)      Morbid obesity (H) 2022     NEGIN (obstructive sleep apnea) 2022     Tibia fracture 2001    left      Past Surgical History:   Procedure Laterality Date     TONSILLECTOMY  2012    inflammed; causing breathing problems with sleeping      ZZHC CLOSED TX TIBIAL SHAFT FX W/O MANIPULATION  2001    plates removed since left      Social History     Tobacco Use     Smoking status: Former Smoker     Packs/day: 0.25     Years: 25.00     Pack years: 6.25     Types: Cigarettes     Quit date: 2021     Years since quittin.9     Smokeless tobacco: Never Used     Tobacco comment: About 4-5 cigarettes per day   Substance Use Topics     Alcohol use: Yes     Alcohol/week: 12.0 standard drinks     Types: 12 Cans of beer per week     Comment: Couple of beers daily     Drug use: No     Comment: smoked 25 years as of        Family History:  Family History   Problem Relation Age of Onset     Cancer Mother         ovarian cancer     Cancer Father         stomach and spinal      Unknown/Adopted Son         car accident      Physical Examination:  Vitals: /88   Pulse 74   Ht 1.829 m (6')   Wt 134.7 kg (297 lb)   SpO2 98%   BMI 40.28 kg/m    BMI= Body mass index is 40.28 kg/m .  GENERAL APPEARANCE: healthy, alert and no distress  HENT: oropharynx crowded  NEURO: mentation intact and speech normal  PSYCH: mentation appears normal and affect normal/bright  Mallampati Class: III.  Tonsillar Stage: 0   surgically removed.    Impression/Plan:    1. Probable Obstructive sleep apnea  2. Hypertension     Patient is a 56 years old male, BMI of 40, with comorbid hypertension, who presents with history of loud snoring, witnessed apneas and poor sleep quality. There is an intermediate to high risk for obstructive sleep apnea and an overnight sleep study is recommended for evaluation.     Plan:     - PSG for assessment of sleep apnea       He will follow up with me in approximately two weeks after his sleep study has been competed to review the results and discuss plan of care.       Polysomnography reviewed.  Obstructive sleep apnea reviewed.  Complications of untreated sleep apnea were reviewed.    I spent a total of 30 minutes for this appointment on this date of service which include time spent before, during and after the visit for chart review, patient care, counseling and coordination of care.    Dr. Gurwinder Roberto       CC: Carlos Alberto Hunter

## 2022-08-18 ENCOUNTER — TRANSFERRED RECORDS (OUTPATIENT)
Dept: HEALTH INFORMATION MANAGEMENT | Facility: CLINIC | Age: 57
End: 2022-08-18

## 2022-08-20 ENCOUNTER — HEALTH MAINTENANCE LETTER (OUTPATIENT)
Age: 57
End: 2022-08-20

## 2022-11-20 ENCOUNTER — HEALTH MAINTENANCE LETTER (OUTPATIENT)
Age: 57
End: 2022-11-20

## 2023-01-12 ENCOUNTER — OFFICE VISIT (OUTPATIENT)
Dept: URGENT CARE | Facility: URGENT CARE | Age: 58
End: 2023-01-12
Payer: COMMERCIAL

## 2023-01-12 ENCOUNTER — NURSE TRIAGE (OUTPATIENT)
Dept: NURSING | Facility: CLINIC | Age: 58
End: 2023-01-12

## 2023-01-12 VITALS
OXYGEN SATURATION: 99 % | RESPIRATION RATE: 16 BRPM | HEART RATE: 64 BPM | TEMPERATURE: 98 F | DIASTOLIC BLOOD PRESSURE: 93 MMHG | WEIGHT: 289 LBS | BODY MASS INDEX: 39.2 KG/M2 | SYSTOLIC BLOOD PRESSURE: 143 MMHG

## 2023-01-12 DIAGNOSIS — M62.830 PARASPINAL MUSCLE SPASM: Primary | ICD-10-CM

## 2023-01-12 PROCEDURE — 99213 OFFICE O/P EST LOW 20 MIN: CPT | Performed by: FAMILY MEDICINE

## 2023-01-12 RX ORDER — CYCLOBENZAPRINE HCL 10 MG
10 TABLET ORAL 3 TIMES DAILY PRN
Qty: 15 TABLET | Refills: 0 | Status: SHIPPED | OUTPATIENT
Start: 2023-01-12 | End: 2023-06-14

## 2023-01-12 NOTE — PATIENT INSTRUCTIONS
Okay to take ibuprofen 600mg(3 tablets of ibuprofen) 3x/day    The flexeril is a muscle relaxant and can make you drowsy.    Use some heat or ice on the back    Salonpas are patches placed on the back that numb it.

## 2023-01-12 NOTE — TELEPHONE ENCOUNTER
"Patient will go to  at 10am at      Reason for Disposition    SEVERE back pain (e.g., excruciating, unable to do any normal activities) and not improved after pain medicine and CARE ADVICE    Additional Information    Negative: Passed out (i.e., fainted, collapsed and was not responding)    Negative: Shock suspected (e.g., cold/pale/clammy skin, too weak to stand, low BP, rapid pulse)    Negative: Sounds like a life-threatening emergency to the triager    Negative: Major injury to the back (e.g., MVA, fall > 10 feet or 3 meters, penetrating injury, etc.)    Negative: Pain in the upper back over the ribs (rib cage) that radiates (travels) into the chest    Negative: Pain in the upper back over the ribs (rib cage) and worsened by coughing (or clearly increases with breathing)    Negative: Back pain during pregnancy    Negative: SEVERE back pain of sudden onset and age > 60 years    Negative: SEVERE abdominal pain (e.g., excruciating)    Negative: Abdominal pain and age > 60 years    Negative: Unable to urinate (or only a few drops) and bladder feels very full    Negative: Loss of bladder or bowel control (urine or bowel incontinence; wetting self, leaking stool) of new-onset    Negative: Numbness (loss of sensation) in groin or rectal area    Negative: Pain radiates into groin, scrotum    Negative: Blood in urine (red, pink, or tea-colored)    Negative: Vomiting and pain over lower ribs of back (i.e., flank - kidney area)    Negative: Weakness of a leg or foot (e.g., unable to bear weight, dragging foot)    Negative: Patient sounds very sick or weak to the triager    Negative: Fever > 100.4 F (38.0 C) and flank pain    Negative: Pain or burning with passing urine (urination)    Answer Assessment - Initial Assessment Questions  1. ONSET: \"When did the pain begin?\"       sunday  2. LOCATION: \"Where does it hurt?\" (upper, mid or lower back)      Upper left below shoulder   3. SEVERITY: \"How bad is the pain?\"  (e.g., " "Scale 1-10; mild, moderate, or severe)    - MILD (1-3): doesn't interfere with normal activities     - MODERATE (4-7): interferes with normal activities or awakens from sleep     - SEVERE (8-10): excruciating pain, unable to do any normal activities       Worse when laying down 9 and when sitting its mild  4. PATTERN: \"Is the pain constant?\" (e.g., yes, no; constant, intermittent)       Constant but intensity changes  5. RADIATION: \"Does the pain shoot into your legs or elsewhere?\"      no  6. CAUSE:  \"What do you think is causing the back pain?\"       no  7. BACK OVERUSE:  \"Any recent lifting of heavy objects, strenuous work or exercise?\"      no  8. MEDICATIONS: \"What have you taken so far for the pain?\" (e.g., nothing, acetaminophen, NSAIDS)      Took advil yesterday didn't help  9. NEUROLOGIC SYMPTOMS: \"Do you have any weakness, numbness, or problems with bowel/bladder control?\"      no  10. OTHER SYMPTOMS: \"Do you have any other symptoms?\" (e.g., fever, abdominal pain, burning with urination, blood in urine)        no  11. PREGNANCY: \"Is there any chance you are pregnant?\" (e.g., yes, no; LMP)        no    Protocols used: BACK PAIN-A-OH      "

## 2023-05-16 DIAGNOSIS — I10 HYPERTENSION, UNSPECIFIED TYPE: ICD-10-CM

## 2023-05-17 RX ORDER — LOSARTAN POTASSIUM AND HYDROCHLOROTHIAZIDE 25; 100 MG/1; MG/1
1 TABLET ORAL DAILY
Qty: 30 TABLET | Refills: 1 | Status: SHIPPED | OUTPATIENT
Start: 2023-05-17 | End: 2023-06-19

## 2023-05-17 RX ORDER — AMLODIPINE BESYLATE 10 MG/1
TABLET ORAL
Qty: 30 TABLET | Refills: 1 | Status: SHIPPED | OUTPATIENT
Start: 2023-05-17 | End: 2023-06-19

## 2023-05-31 ENCOUNTER — THERAPY VISIT (OUTPATIENT)
Dept: SLEEP MEDICINE | Facility: CLINIC | Age: 58
End: 2023-05-31
Payer: COMMERCIAL

## 2023-05-31 DIAGNOSIS — R06.81 WITNESSED APNEIC SPELLS: ICD-10-CM

## 2023-05-31 DIAGNOSIS — G47.33 OSA (OBSTRUCTIVE SLEEP APNEA): ICD-10-CM

## 2023-05-31 DIAGNOSIS — R29.818 SUSPECTED SLEEP APNEA: ICD-10-CM

## 2023-05-31 DIAGNOSIS — R06.83 SNORING: ICD-10-CM

## 2023-05-31 DIAGNOSIS — I10 PRIMARY HYPERTENSION: ICD-10-CM

## 2023-05-31 PROCEDURE — 95811 POLYSOM 6/>YRS CPAP 4/> PARM: CPT | Performed by: INTERNAL MEDICINE

## 2023-06-05 LAB — SLPCOMP: NORMAL

## 2023-06-08 ASSESSMENT — SLEEP AND FATIGUE QUESTIONNAIRES
HOW LIKELY ARE YOU TO NOD OFF OR FALL ASLEEP WHILE SITTING INACTIVE IN A PUBLIC PLACE: WOULD NEVER DOZE
HOW LIKELY ARE YOU TO NOD OFF OR FALL ASLEEP IN A CAR, WHILE STOPPED FOR A FEW MINUTES IN TRAFFIC: WOULD NEVER DOZE
HOW LIKELY ARE YOU TO NOD OFF OR FALL ASLEEP WHEN YOU ARE A PASSENGER IN A CAR FOR AN HOUR WITHOUT A BREAK: MODERATE CHANCE OF DOZING
HOW LIKELY ARE YOU TO NOD OFF OR FALL ASLEEP WHILE WATCHING TV: MODERATE CHANCE OF DOZING
HOW LIKELY ARE YOU TO NOD OFF OR FALL ASLEEP WHILE SITTING AND TALKING TO SOMEONE: WOULD NEVER DOZE
HOW LIKELY ARE YOU TO NOD OFF OR FALL ASLEEP WHILE SITTING QUIETLY AFTER LUNCH WITHOUT ALCOHOL: WOULD NEVER DOZE
HOW LIKELY ARE YOU TO NOD OFF OR FALL ASLEEP WHILE SITTING AND READING: WOULD NEVER DOZE
HOW LIKELY ARE YOU TO NOD OFF OR FALL ASLEEP WHILE LYING DOWN TO REST IN THE AFTERNOON WHEN CIRCUMSTANCES PERMIT: SLIGHT CHANCE OF DOZING

## 2023-06-14 ENCOUNTER — OFFICE VISIT (OUTPATIENT)
Dept: SLEEP MEDICINE | Facility: CLINIC | Age: 58
End: 2023-06-14
Payer: COMMERCIAL

## 2023-06-14 VITALS
HEART RATE: 82 BPM | DIASTOLIC BLOOD PRESSURE: 86 MMHG | OXYGEN SATURATION: 99 % | HEIGHT: 72 IN | SYSTOLIC BLOOD PRESSURE: 125 MMHG | BODY MASS INDEX: 41.17 KG/M2 | WEIGHT: 304 LBS

## 2023-06-14 DIAGNOSIS — G47.33 OSA (OBSTRUCTIVE SLEEP APNEA): Primary | ICD-10-CM

## 2023-06-14 PROCEDURE — 99213 OFFICE O/P EST LOW 20 MIN: CPT | Performed by: INTERNAL MEDICINE

## 2023-06-14 NOTE — PROGRESS NOTES
Sleep Study Follow-Up Visit:    Date on this visit: 6/14/2023    Efraín Estrada comes in today for follow-up of his sleep study done on 5/31/2023 at the [facility] for possible sleep apnea.    Sleep latency 188 minutes without Ambien.  REM achieved.   REM latency 91.5 minutes.  Sleep efficiency 40.3%. Total sleep time 142.5 minutes.    Sleep architecture:  Stage 1, 8% (5%), stage 2, 53.3% (45-55%), stage 3, 8.4% (15-20%), stage REM, 30.2% (20-25%).  AHI was 22.3, with desaturations. RDI 23.2.  REM AHI 44.7, consistent with severe REM NEGIN.  Supine AHI 22.3, consistent with moderate SUPINE NEGIN.  Periodic Limb Movement Index 0/hour.       CPAP titration:  Sleep latency 24 minutes.  REM latency 47 minutes.  Sleep efficiency 59%. Total sleep time 115 minutes. Sleep architecture:  Stage 1, 7% (5%), stage 2, 50.9% (45-55%), stage 3, 22.6% (15-20%), stage REM, 19.6% (20-25%).  CPAP was titrated to 9 cm with  elimination of apneas, hypopneas and desaturations. Supine REM was not noted at this pressure.  Periodic Limb Movement Index 3.7/hour.        These findings were reviewed with patient.     Past medical/surgical history, family history, social history, medications and allergies were reviewed.      /86   Pulse 82   Ht 1.829 m (6')   Wt 137.9 kg (304 lb)   SpO2 99%   BMI 41.23 kg/m      Impression/Plan:    1.  Obstructive sleep apnea  2.  Sleep associated hypoxemia  3. Hypertension     - PSG result was reviewed in detail.  We discussed moderate obstructive sleep apnea, consequences of untreated disease and management options.  CPAP therapy is the treatment of choice patient wants to start CPAP.    Plan:     - Start Auto titrating CPAP therapy with a pressure range of 8 to 15 cm H2O  -Follow-up in 2 to 3 months after starting treatment    I spent a total of 20 minutes for this appointment on this date of service which include time spent before, during and after the visit for chart review,  patient care, counseling and coordination of care.    Dr. Gurwinder Roberto     CC: No Ref-Primary, Physician

## 2023-06-19 ENCOUNTER — OFFICE VISIT (OUTPATIENT)
Dept: INTERNAL MEDICINE | Facility: CLINIC | Age: 58
End: 2023-06-19
Payer: COMMERCIAL

## 2023-06-19 VITALS
HEART RATE: 77 BPM | HEIGHT: 72 IN | TEMPERATURE: 98 F | OXYGEN SATURATION: 99 % | BODY MASS INDEX: 41.5 KG/M2 | SYSTOLIC BLOOD PRESSURE: 130 MMHG | DIASTOLIC BLOOD PRESSURE: 82 MMHG | WEIGHT: 306.4 LBS

## 2023-06-19 DIAGNOSIS — G47.33 OSA (OBSTRUCTIVE SLEEP APNEA): ICD-10-CM

## 2023-06-19 DIAGNOSIS — Z13.6 CARDIOVASCULAR SCREENING; LDL GOAL LESS THAN 100: ICD-10-CM

## 2023-06-19 DIAGNOSIS — Z23 HIGH PRIORITY FOR 2019-NCOV VACCINE: ICD-10-CM

## 2023-06-19 DIAGNOSIS — I10 HYPERTENSION, UNSPECIFIED TYPE: ICD-10-CM

## 2023-06-19 DIAGNOSIS — E66.01 MORBID OBESITY (H): ICD-10-CM

## 2023-06-19 DIAGNOSIS — Z12.5 SCREENING FOR PROSTATE CANCER: ICD-10-CM

## 2023-06-19 LAB
ALBUMIN SERPL BCG-MCNC: 4.6 G/DL (ref 3.5–5.2)
ALP SERPL-CCNC: 73 U/L (ref 40–129)
ALT SERPL W P-5'-P-CCNC: 21 U/L (ref 0–70)
ANION GAP SERPL CALCULATED.3IONS-SCNC: 12 MMOL/L (ref 7–15)
AST SERPL W P-5'-P-CCNC: 20 U/L (ref 0–45)
BILIRUB SERPL-MCNC: 0.2 MG/DL
BUN SERPL-MCNC: 18.4 MG/DL (ref 6–20)
CALCIUM SERPL-MCNC: 9.8 MG/DL (ref 8.6–10)
CHLORIDE SERPL-SCNC: 103 MMOL/L (ref 98–107)
CHOLEST SERPL-MCNC: 174 MG/DL
CREAT SERPL-MCNC: 1.46 MG/DL (ref 0.67–1.17)
DEPRECATED HCO3 PLAS-SCNC: 26 MMOL/L (ref 22–29)
GFR SERPL CREATININE-BSD FRML MDRD: 56 ML/MIN/1.73M2
GLUCOSE SERPL-MCNC: 101 MG/DL (ref 70–99)
HDLC SERPL-MCNC: 61 MG/DL
LDLC SERPL CALC-MCNC: 98 MG/DL
NONHDLC SERPL-MCNC: 113 MG/DL
POTASSIUM SERPL-SCNC: 4.4 MMOL/L (ref 3.4–5.3)
PROT SERPL-MCNC: 7.8 G/DL (ref 6.4–8.3)
PSA SERPL DL<=0.01 NG/ML-MCNC: 4.11 NG/ML (ref 0–3.5)
SODIUM SERPL-SCNC: 141 MMOL/L (ref 136–145)
TRIGL SERPL-MCNC: 74 MG/DL

## 2023-06-19 PROCEDURE — 80053 COMPREHEN METABOLIC PANEL: CPT | Performed by: INTERNAL MEDICINE

## 2023-06-19 PROCEDURE — 91313 COVID-19 BIVALENT 18+ (MODERNA): CPT | Performed by: INTERNAL MEDICINE

## 2023-06-19 PROCEDURE — 0134A COVID-19 BIVALENT 18+ (MODERNA): CPT | Performed by: INTERNAL MEDICINE

## 2023-06-19 PROCEDURE — G0103 PSA SCREENING: HCPCS | Performed by: INTERNAL MEDICINE

## 2023-06-19 PROCEDURE — 36415 COLL VENOUS BLD VENIPUNCTURE: CPT | Performed by: INTERNAL MEDICINE

## 2023-06-19 PROCEDURE — 99214 OFFICE O/P EST MOD 30 MIN: CPT | Mod: 25 | Performed by: INTERNAL MEDICINE

## 2023-06-19 PROCEDURE — 80061 LIPID PANEL: CPT | Performed by: INTERNAL MEDICINE

## 2023-06-19 RX ORDER — LOSARTAN POTASSIUM AND HYDROCHLOROTHIAZIDE 25; 100 MG/1; MG/1
1 TABLET ORAL DAILY
Qty: 90 TABLET | Refills: 3 | Status: SHIPPED | OUTPATIENT
Start: 2023-06-19 | End: 2024-07-12

## 2023-06-19 RX ORDER — AMLODIPINE BESYLATE 10 MG/1
10 TABLET ORAL DAILY
Qty: 90 TABLET | Refills: 3 | Status: SHIPPED | OUTPATIENT
Start: 2023-06-19 | End: 2024-07-12

## 2023-06-19 NOTE — PATIENT INSTRUCTIONS
Continue current medications  Prescriptions refilled.    Labs today as ordered  Reduce calorie/carbohydrate (sugar, bread, potato, pasta, rice, alcohol etc)  intake in diet.  Increase color on your plate with vegetables. Increase  frequency of walking or other aerobic exercise as able (goal is daily)  Start CPAP therapy in early July  Vaccinations: Moderna bivalent booster covid vaccine   I would recommend a  tetanus (TDAP) booster vaccination. You may have it done at any pharmacy. If you wish to have it done at a [facility] pharmacy, then go to [email or web address] to schedule a vaccination appointment  Check with insurance or speak with your pharmacist re: Shingrix vaccine coverage for shingles prevention.  This is a 2 shot series done 2-6 months apart  See me annually or earlier as needed. Update me in [facility] with message   re: weight status in 3 months

## 2023-06-19 NOTE — PROGRESS NOTES
ASSESSMENT:   1. Hypertension, unspecified type  Controlled.  Continue current medication.  Labs ordered  - amLODIPine (NORVASC) 10 MG tablet; Take 1 tablet (10 mg) by mouth daily  Dispense: 90 tablet; Refill: 3  - losartan-hydrochlorothiazide (HYZAAR) 100-25 MG tablet; Take 1 tablet by mouth daily  Dispense: 90 tablet; Refill: 3  - Comprehensive metabolic panel; Future  - PRIMARY CARE FOLLOW-UP SCHEDULING; Future    2. Morbid obesity (H)  Weight increased.  Contributing to hypertension, sleep apnea.  Counseled regarding diet exercise    3. NEGIN (obstructive sleep apnea)  Prescribed CPAP machine.  Start 7/5/23.  Should help lower blood pressure further once CPAP started    4. Screening for prostate cancer  Candidate for screening  - Prostate Specific Antigen Screen; Future    5. CARDIOVASCULAR SCREENING; LDL GOAL LESS THAN 100  Candidate for screening  - Lipid panel reflex to direct LDL Fasting; Future    6. High priority for 2019-nCoV vaccine  - COVID-19 BIVALENT 18+ (MODERNA)      PLAN:  Continue current medications  Prescriptions refilled.    Labs today as ordered  Reduce calorie/carbohydrate (sugar, bread, potato, pasta, rice, alcohol etc)  intake in diet.  Increase color on your plate with vegetables. Increase  frequency of walking or other aerobic exercise as able (goal is daily)  Start CPAP therapy in early July  Vaccinations: Moderna bivalent booster covid vaccine   I would recommend a  tetanus (TDAP) booster vaccination. You may have it done at any pharmacy. If you wish to have it done at a [facility] pharmacy, then go to [email or web address] to schedule a vaccination appointment  Check with insurance or speak with your pharmacist re: Shingrix vaccine coverage for shingles prevention.  This is a 2 shot series done 2-6 months apart  See me annually or earlier as needed. [facility] message   re: weight status in 3 months      (Chart documentation was completed, in part, with [employer]  voice-recognition software. Even though reviewed, some grammatical, spelling, and word errors may remain.)    Carlos Alberto Hunter MD  Internal Medicine Department  [facility] is a 57 year old, presenting for the following health issues:  Hypertension       History of Present Illness       Hypertension: He presents for follow up of hypertension.  He does check blood pressure  regularly outside of the clinic. Outpatient blood pressures have not been over 140/90. He follows a low salt diet.     He eats 0-1 servings of fruits and vegetables daily.He consumes 1 sweetened beverage(s) daily.He exercises with enough effort to increase his heart rate 20 to 29 minutes per day.  He exercises with enough effort to increase his heart rate 3 or less days per week.   He is taking medications regularly.     Most recent lab results reviewed with pt.      Weight up 17 pounds left.  Has been eating more carbohydrates and  less exercise recently.   MOtivated to lose weight and planning on getting his mountain bike  Out of storage in  The next week. Denies chest pain, shortness of breath, abdominal pain, headache, vision changes or side effects with medications.   Colonoscopy UTD  Working with sleep clinic and prescribed CPAP for NEGIN and will be picking up the new machine/mask on 7/5/23       Additional ROS:   Constitutional, HEENT, Cardiovascular, Pulmonary, GI and , Neuro, MSK and Psych review of systems/symptoms are otherwise negative or unchanged from previous, except as noted above.      OBJECTIVE:  /82   Pulse 77   Temp 98  F (36.7  C) (Temporal)   Ht 1.829 m (6')   Wt 139 kg (306 lb 6.4 oz)   SpO2 99%   BMI 41.56 kg/m     Estimated body mass index is 41.56 kg/m  as calculated from the following:    Height as of this encounter: 1.829 m (6').    Weight as of this encounter: 139 kg (306 lb 6.4 oz).         Neck: no adenopathy. Thyroid normal to palpation. No  bruits  Pulm: Lungs clear to auscultation   CV: Regular rates and rhythm  GI: Soft, obese, nontender, Normal active bowel sounds, No hepatosplenomegaly or masses palpable  Ext: Peripheral pulses intact. No edema.

## 2023-07-05 ENCOUNTER — DOCUMENTATION ONLY (OUTPATIENT)
Dept: SLEEP MEDICINE | Facility: CLINIC | Age: 58
End: 2023-07-05
Payer: COMMERCIAL

## 2023-07-05 DIAGNOSIS — G47.33 OBSTRUCTIVE SLEEP APNEA (ADULT) (PEDIATRIC): Primary | ICD-10-CM

## 2023-07-05 NOTE — PROGRESS NOTES
Patient was offered choice of vendor and chose [facility].  Patient Efraín Estrada was set up at [place] on July 5, 2023. Patient received a Resmed Airsense 11 Pressures were set at 8-15 cm H2O.   Patient s ramp is 4 cm H2O for Auto and FLEX/EPR is EPR, 2.  Patient received a Resmed Mask name: AIRFIT F20  Full Face mask size Medium, heated tubing and heated humidifier.  Patient has the following compliance requirements: usage only.  Miguelangel Izaguirre

## 2023-07-10 ENCOUNTER — DOCUMENTATION ONLY (OUTPATIENT)
Dept: SLEEP MEDICINE | Facility: CLINIC | Age: 58
End: 2023-07-10
Payer: COMMERCIAL

## 2023-07-10 NOTE — PROGRESS NOTES
3 day Sleep therapy management telephone visit    Diagnostic AHI: 22.3  PSG    Confirmed with patient at time of call- N/A Patient is still interested in STM service       Message left for patient to return call.        Objective data     Order Settings for PAP  CPAP min     CPAP max              Device settings from machine CPAP min 8.0     CPAP max 15.0           EPR Setting TWO    RESMED soft response  OFF     Assessment: Nightly usage most nights under four hours       Action plan: Patient to have 14 day STM visit. Patient has a follow up visit scheduled:   yes within 31-90 days of set up    Replacement device: No  STM ordered by provider: Yes     Total time spent on accessing and  interpreting remote patient PAP therapy data  10 minutes    Total time spent counseling, coaching  and reviewing PAP therapy data with patient  1 minutes    620[address] no

## 2023-07-10 NOTE — PROGRESS NOTES
Patient called back and he slowly getting used to CPAP he says. Patient had questions about his compliance. Patient confident he will get used to CPAP.

## 2023-07-20 ENCOUNTER — DOCUMENTATION ONLY (OUTPATIENT)
Dept: SLEEP MEDICINE | Facility: CLINIC | Age: 58
End: 2023-07-20
Payer: COMMERCIAL

## 2023-07-20 NOTE — PROGRESS NOTES
14  DAY STM VISIT    Diagnostic AHI: 22.3  PSG    Message left for patient to return call     Assessment: Pt meeting objective benchmarks.       Action plan: waiting for patient to return call.  and pt to have 30 day STM visit.      Device type: Auto-CPAP    PAP settings: CPAP min 8.0 cm  H20       CPAP max 15.0 cm  H20      95th% pressure 10.9 cm  H20        RESMED EPR level Setting: TWO    RESMED Soft response setting:  OFF    Mask type:  Full Face Mask    Objective measures: 14 day rolling measures      Compliance  78 %      Leak  105.86  lpm  last  upload      AHI 0.49   last  upload      Average number of minutes 225      Objective measure goal  Compliance   Goal >70%  Leak   Goal < 24 lpm  AHI  Goal < 5  Usage  Goal >240        Total time spent on accessing and interpreting remote patient PAP therapy data  10 minutes    Total time spent counseling, coaching  and reviewing PAP therapy data with patient  1 minutes    [id number]  [id number]  no (3 day STM)

## 2023-08-21 ENCOUNTER — TELEPHONE (OUTPATIENT)
Dept: INTERNAL MEDICINE | Facility: CLINIC | Age: 58
End: 2023-08-21
Payer: COMMERCIAL

## 2023-08-21 NOTE — TELEPHONE ENCOUNTER
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 Pt denies any symptoms -dysuria, flank pain, fever and has no hx of kidney stones. Pt scheduled VV with 08/31 to go over lab results, but would prefer OV.     Ok to use same day visit 08/24 at 1:00 PM or 1:30 PM?  Or of to convert 08/31 VV to in clinic visit?    If PCP approves OV, please schedule an appt and leave a detailed voice message for pt with appt date and time.

## 2023-08-21 NOTE — TELEPHONE ENCOUNTER
OK to see me 8/24/23 in one of the open same day appt slots  (1:00 or 1:30pm) for  further evaluation.Reschedule appt to that day and have pt bring in any lab results from DOT physical to the appt

## 2023-08-24 ENCOUNTER — OFFICE VISIT (OUTPATIENT)
Dept: INTERNAL MEDICINE | Facility: CLINIC | Age: 58
End: 2023-08-24
Payer: COMMERCIAL

## 2023-08-24 VITALS
BODY MASS INDEX: 40.47 KG/M2 | TEMPERATURE: 98.8 F | WEIGHT: 298.8 LBS | HEIGHT: 72 IN | SYSTOLIC BLOOD PRESSURE: 118 MMHG | OXYGEN SATURATION: 100 % | HEART RATE: 77 BPM | DIASTOLIC BLOOD PRESSURE: 79 MMHG

## 2023-08-24 DIAGNOSIS — Z23 NEED FOR DIPHTHERIA-TETANUS-PERTUSSIS (TDAP) VACCINE: ICD-10-CM

## 2023-08-24 DIAGNOSIS — R82.90 ABNORMAL FINDING IN URINE: Primary | ICD-10-CM

## 2023-08-24 DIAGNOSIS — R94.4 ABNORMAL RENAL FUNCTION TEST: ICD-10-CM

## 2023-08-24 DIAGNOSIS — Z12.5 SCREENING FOR PROSTATE CANCER: ICD-10-CM

## 2023-08-24 DIAGNOSIS — I10 HYPERTENSION, UNSPECIFIED TYPE: ICD-10-CM

## 2023-08-24 LAB
ALBUMIN UR-MCNC: NEGATIVE MG/DL
ANION GAP SERPL CALCULATED.3IONS-SCNC: 11 MMOL/L (ref 7–15)
APPEARANCE UR: CLEAR
BACTERIA #/AREA URNS HPF: ABNORMAL /HPF
BILIRUB UR QL STRIP: NEGATIVE
BUN SERPL-MCNC: 18.5 MG/DL (ref 6–20)
CALCIUM SERPL-MCNC: 9.6 MG/DL (ref 8.6–10)
CHLORIDE SERPL-SCNC: 101 MMOL/L (ref 98–107)
COLOR UR AUTO: YELLOW
CREAT SERPL-MCNC: 1.46 MG/DL (ref 0.67–1.17)
CREAT UR-MCNC: 161 MG/DL
DEPRECATED HCO3 PLAS-SCNC: 27 MMOL/L (ref 22–29)
GFR SERPL CREATININE-BSD FRML MDRD: 56 ML/MIN/1.73M2
GLUCOSE SERPL-MCNC: 98 MG/DL (ref 70–99)
GLUCOSE UR STRIP-MCNC: NEGATIVE MG/DL
HGB UR QL STRIP: NEGATIVE
KETONES UR STRIP-MCNC: NEGATIVE MG/DL
LEUKOCYTE ESTERASE UR QL STRIP: NEGATIVE
MICROALBUMIN UR-MCNC: 69.5 MG/L
MICROALBUMIN/CREAT UR: 43.17 MG/G CR (ref 0–17)
NITRATE UR QL: NEGATIVE
PH UR STRIP: 6 [PH] (ref 5–7)
POTASSIUM SERPL-SCNC: 4.2 MMOL/L (ref 3.4–5.3)
PSA SERPL DL<=0.01 NG/ML-MCNC: 4.03 NG/ML (ref 0–3.5)
RBC #/AREA URNS AUTO: ABNORMAL /HPF
SODIUM SERPL-SCNC: 139 MMOL/L (ref 136–145)
SP GR UR STRIP: 1.01 (ref 1–1.03)
SQUAMOUS #/AREA URNS AUTO: ABNORMAL /LPF
UROBILINOGEN UR STRIP-ACNC: 0.2 E.U./DL
WBC #/AREA URNS AUTO: ABNORMAL /HPF

## 2023-08-24 PROCEDURE — 36415 COLL VENOUS BLD VENIPUNCTURE: CPT | Performed by: INTERNAL MEDICINE

## 2023-08-24 PROCEDURE — 99214 OFFICE O/P EST MOD 30 MIN: CPT | Mod: 25 | Performed by: INTERNAL MEDICINE

## 2023-08-24 PROCEDURE — 90715 TDAP VACCINE 7 YRS/> IM: CPT | Performed by: INTERNAL MEDICINE

## 2023-08-24 PROCEDURE — 81001 URINALYSIS AUTO W/SCOPE: CPT | Performed by: INTERNAL MEDICINE

## 2023-08-24 PROCEDURE — 90471 IMMUNIZATION ADMIN: CPT | Performed by: INTERNAL MEDICINE

## 2023-08-24 PROCEDURE — 80048 BASIC METABOLIC PNL TOTAL CA: CPT | Performed by: INTERNAL MEDICINE

## 2023-08-24 PROCEDURE — 82043 UR ALBUMIN QUANTITATIVE: CPT | Performed by: INTERNAL MEDICINE

## 2023-08-24 PROCEDURE — 82570 ASSAY OF URINE CREATININE: CPT | Performed by: INTERNAL MEDICINE

## 2023-08-24 PROCEDURE — G0103 PSA SCREENING: HCPCS | Performed by: INTERNAL MEDICINE

## 2023-08-24 NOTE — PROGRESS NOTES
ASSESSMENT:   1. Abnormal finding in urine  Reported blood in urine and proteinuria with dipstick test only at recent DOT physical.  Urine appearance normal to patient.  Recheck labs as ordered below  - Albumin Random Urine Quantitative with Creat Ratio; Future  - Basic metabolic panel; Future  - UA with Microscopic reflex to Culture - lab collect; Future  - Albumin Random Urine Quantitative with Creat Ratio  - Basic metabolic panel  - UA with Microscopic reflex to Culture - lab collect    2. Hypertension, unspecified type  Controlled.  Continue current medication pending lab results.  GFR remains reduced, will reduce hydrochlorothiazide dosage  - Albumin Random Urine Quantitative with Creat Ratio; Future  - Basic metabolic panel; Future  - UA with Microscopic reflex to Culture - lab collect; Future  - Albumin Random Urine Quantitative with Creat Ratio  - Basic metabolic panel  - UA with Microscopic reflex to Culture - lab collect    3. Abnormal renal function test  Last GFR slightly below normal but was done fasting with reduced fluid intake that day.  Repeat labs as ordered  - Albumin Random Urine Quantitative with Creat Ratio; Future  - Basic metabolic panel; Future  - UA with Microscopic reflex to Culture - lab collect; Future  - Albumin Random Urine Quantitative with Creat Ratio  - Basic metabolic panel  - UA with Microscopic reflex to Culture - lab collect    4. Need for diphtheria-tetanus-pertussis (Tdap) vaccine  Candidate for tetanus vaccination  - TDAP 10-64Y (ADACEL,BOOSTRIX)    5. Screening for prostate cancer  Previous PSA elevated but improving.  Prostate smooth on EVA today.  Repeat PSA  - Prostate Specific Antigen Screen; Future  - Prostate Specific Antigen Screen      PLAN:  Continue current medication  Labs as ordered  Tdap vaccine  Continue diet and exercise for further weight loss      (Chart documentation was completed, in part, with [employer] voice-recognition software. Even though reviewed,  some grammatical, spelling, and word errors may remain.)    Carlos Alberto Hunter MD  Internal Medicine Department  [facility] AMIRAH Kenny is a 57 year old, presenting for the following health issues:  Results      History of Present Illness       Reason for visit:  Concerned about results from a job related physical.    He eats 2-3 servings of fruits and vegetables daily.He consumes 0 sweetened beverage(s) daily.He exercises with enough effort to increase his heart rate 10 to 19 minutes per day.  He exercises with enough effort to increase his heart rate 4 days per week.   He is taking medications regularly.     Additional concerns: hernia       Most recent lab results reviewed with pt.      Patient states he recently underwent a DOT physical and had normal-appearing urine that was tested under a dipstick and shown to have blood and protein present.  No results available for review.  No microscopic urinalysis was done per patient.  Told to see his doctor for further evaluation.  Urine continues to appear normal to patient and clear.  No previous visible gross hematuria.  Denies acute back pain. Denies chest pain, shortness of breath, abdominal pain, headache, vision changes or side effects with medications.  Usual exercise alone.  No heavy muscle workouts.  Denies muscle soreness currently.  No fevers or chills.  Weight down 8 pounds with improved exercise.  Recently started CPAP therapy       Additional ROS:   Constitutional, HEENT, Cardiovascular, Pulmonary, GI and , Neuro, MSK and Psych review of systems/symptoms are otherwise negative or unchanged from previous, except as noted above.      OBJECTIVE:  /79   Pulse 77   Temp 98.8  F (37.1  C) (Oral)   Ht 1.829 m (6')   Wt 135.5 kg (298 lb 12.8 oz)   SpO2 100%   BMI 40.52 kg/m     Estimated body mass index is 40.52 kg/m  as calculated from the following:    Height as of this encounter: 1.829 m (6').    Weight as  of this encounter: 135.5 kg (298 lb 12.8 oz).     Neck: no adenopathy. Thyroid normal to palpation. No bruits  Pulm: Lungs clear to auscultation   CV: Regular rates and rhythm  GI: Soft, obese, nontender, Normal active bowel sounds   Ext: Peripheral pulses intact. No edema. Extremity muscles NT to palpation  Rectal: Prostate smooth and normal in size.  No nodularity palpable

## 2023-09-05 ASSESSMENT — SLEEP AND FATIGUE QUESTIONNAIRES
HOW LIKELY ARE YOU TO NOD OFF OR FALL ASLEEP WHILE SITTING INACTIVE IN A PUBLIC PLACE: WOULD NEVER DOZE
HOW LIKELY ARE YOU TO NOD OFF OR FALL ASLEEP WHEN YOU ARE A PASSENGER IN A CAR FOR AN HOUR WITHOUT A BREAK: WOULD NEVER DOZE
HOW LIKELY ARE YOU TO NOD OFF OR FALL ASLEEP WHILE SITTING AND READING: WOULD NEVER DOZE
HOW LIKELY ARE YOU TO NOD OFF OR FALL ASLEEP WHILE LYING DOWN TO REST IN THE AFTERNOON WHEN CIRCUMSTANCES PERMIT: WOULD NEVER DOZE
HOW LIKELY ARE YOU TO NOD OFF OR FALL ASLEEP WHILE WATCHING TV: SLIGHT CHANCE OF DOZING
HOW LIKELY ARE YOU TO NOD OFF OR FALL ASLEEP WHILE SITTING AND TALKING TO SOMEONE: WOULD NEVER DOZE
HOW LIKELY ARE YOU TO NOD OFF OR FALL ASLEEP WHILE SITTING QUIETLY AFTER LUNCH WITHOUT ALCOHOL: WOULD NEVER DOZE
HOW LIKELY ARE YOU TO NOD OFF OR FALL ASLEEP IN A CAR, WHILE STOPPED FOR A FEW MINUTES IN TRAFFIC: WOULD NEVER DOZE

## 2023-09-12 ENCOUNTER — OFFICE VISIT (OUTPATIENT)
Dept: SLEEP MEDICINE | Facility: CLINIC | Age: 58
End: 2023-09-12
Payer: COMMERCIAL

## 2023-09-12 VITALS
SYSTOLIC BLOOD PRESSURE: 136 MMHG | DIASTOLIC BLOOD PRESSURE: 82 MMHG | BODY MASS INDEX: 41.45 KG/M2 | WEIGHT: 306 LBS | OXYGEN SATURATION: 99 % | HEIGHT: 72 IN | HEART RATE: 84 BPM

## 2023-09-12 DIAGNOSIS — G47.33 OSA (OBSTRUCTIVE SLEEP APNEA): Primary | ICD-10-CM

## 2023-09-12 PROCEDURE — 99213 OFFICE O/P EST LOW 20 MIN: CPT | Performed by: INTERNAL MEDICINE

## 2023-09-12 NOTE — PATIENT INSTRUCTIONS
Your There is no height or weight on file to calculate BMI.  Weight management is a personal decision.  If you are interested in exploring weight loss strategies, the following discussion covers the approaches that may be successful. Body mass index (BMI) is one way to tell whether you are at a healthy weight, overweight, or obese. It measures your weight in relation to your height.  A BMI of 18.5 to 24.9 is in the healthy range. A person with a BMI of 25 to 29.9 is considered overweight, and someone with a BMI of 30 or greater is considered obese. More than two-thirds of American adults are considered overweight or obese.  Being overweight or obese increases the risk for further weight gain. Excess weight may lead to heart disease and diabetes.  Creating and following plans for healthy eating and physical activity may help you improve your health.  Weight control is part of healthy lifestyle and includes exercise, emotional health, and healthy eating habits. Careful eating habits lifelong are the mainstay of weight control. Though there are significant health benefits from weight loss, long-term weight loss with diet alone may be very difficult to achieve- studies show long-term success with dietary management in less than 10% of people. Attaining a healthy weight may be especially difficult to achieve in those with severe obesity. In some cases, medications, devices and surgical management might be considered.  What can you do?  If you are overweight or obese and are interested in methods for weight loss, you should discuss this with your provider.   Consider reducing daily calorie intake by 500 calories.   Keep a food journal.   Avoiding skipping meals, consider cutting portions instead.    Diet combined with exercise helps maintain muscle while optimizing fat loss. Strength training is particularly important for building and maintaining muscle mass. Exercise helps reduce stress, increase energy, and improves  fitness. Increasing exercise without diet control, however, may not burn enough calories to loose weight.     Start walking three days a week 10-20 minutes at a time  Work towards walking thirty minutes five days a week   Eventually, increase the speed of your walking for 1-2 minutes at time    In addition, we recommend that you review healthy lifestyles and methods for weight loss available through the National Institutes of Health patient information sites:  http://win.niddk.nih.gov/publications/index.htm    And look into health and wellness programs that may be available through your health insurance provider, employer, local community center, or alessio club.

## 2023-09-12 NOTE — NURSING NOTE
"Chief Complaint   Patient presents with    CPAP Follow Up     CPAP follow up       Initial /82   Pulse 84   Ht 1.829 m (6' 0.01\")   Wt 138.8 kg (306 lb)   SpO2 99%   BMI 41.49 kg/m   Estimated body mass index is 41.49 kg/m  as calculated from the following:    Height as of this encounter: 1.829 m (6' 0.01\").    Weight as of this encounter: 138.8 kg (306 lb).    Medication Reconciliation: complete  ESS 1  Kristal Zavala MA   "

## 2023-09-12 NOTE — PROGRESS NOTES
Obstructive Sleep Apnea - PAP Follow-Up Visit:    Chief Complaint   Patient presents with    CPAP Follow Up     CPAP follow up       Efraín Estrada comes in today for follow-up of their moderate sleep apnea, managed with CPAP.     5/31/2023 [facility] Sleep Study (297.0 lbs) - AHI 22.3, RDI 23.2, Supine AHI 22.3, REM AHI 44.7, Low O2% 78.0%, Time Spent ?88% 20.7, Time Spent ?89% 27.6. Treatment was titrated to a pressure of CPAP 9 with an AHI -. Time spent in REM supine at this pressure was - minutes.     Do you use a CPAP Machine at home: Yes  Overall, on a scale of 0-10 how would you rate your CPAP (0 poor, 10 great): 10  Is your mask comfortable: Yes  If not, why:    Is you mask leaking: No  If yes, where do you feel it:    How many night per week does the mask leak (0-7):    Do you notice snoring with mask on: No  Do you notice gasping arousals with mask on: No  Are you having significant oral or nasal dryness: No  Is the pressure setting comfortable: Yes  In not, why:    What type of mask do you use: Full Face Mask  What is your typical bedtime: 9pm  How long does it take you to go to sleep on PAP therapy: 10-15 minutes  What time do you typically get out of bed for the day: 7am  How many hours on average per night are you using PAP therapy: 5 hrs  How many hours are you sleeping per night: 6 -7 hrs  Do you feel well rested in the morning: Yes    EPWORTH SLEEPINESS SCALE         9/5/2023     1:05 PM    [place] Sleepiness Scale ( JAYCEE Ruano  [id number]<br>ESS - [place]/English - Final version - 21 Nov 07 - [facility] [place].)   Sitting and reading Would never doze   Watching TV Slight chance of dozing   Sitting, inactive in a public place (e.g. a theatre or a meeting) Would never doze   As a passenger in a car for an hour without a break Would never doze   Lying down to rest in the afternoon when circumstances permit Would never doze   Sitting and talking to someone Would never doze   Sitting quietly  "after a lunch without alcohol Would never doze   In a car, while stopped for a few minutes in traffic Would never doze   [place] Score (MC) 1   [place] Score (Sleep) 1       INSOMNIA SEVERITY INDEX (DAVID)          9/5/2023     1:02 PM   Insomnia Severity Index (DAVID)   Difficulty falling asleep 1   Difficulty staying asleep 1   Problems waking up too early 1   How SATISFIED/DISSATISFIED are you with your CURRENT sleep pattern? 2   How NOTICEABLE to others do you think your sleep problem is in terms of impairing the quality of your life? 0   How WORRIED/DISTRESSED are you about your current sleep problem? 2   To what extent do you consider your sleep problem to INTERFERE with your daily functioning (e.g. daytime fatigue, mood, ability to function at work/daily chores, concentration, memory, mood, etc.) CURRENTLY? 0   DAVID Total Score 7       Guidelines for Scoring/Interpretation:  Total score categories:  0-7 = No clinically significant insomnia   8-14 = Subthreshold insomnia   15-21 = Clinical insomnia (moderate severity)  22-28 = Clinical insomnia (severe)  Used via courtesy of www.Owlient.va.gov with permission from Jai Mackey PhD., [facility]    ResMed     Auto-PAP 8.0 - 15.0 cmH2O 30 day usage data:    76% of days with > 4 hours of use. 3/30 days with no use.   Average use 262 minutes per day.   95%ile Leak 72.39 L/min.   CPAP 95% pressure 12.3 cm.   AHI 0.99 events per hour.       /82   Pulse 84   Ht 1.829 m (6' 0.01\")   Wt 138.8 kg (306 lb)   SpO2 99%   BMI 41.49 kg/m      Impression/Plan:     Moderate sleep apnea.     - Patient is using CPAP regularly and benefits from therapy. He no longer feels tired during the day and he no longer has choking episodes in sleep.     - I reviewed download data and graphs from his device for last 30 days.  Regular compliance and satisfactory residual AHI is demonstrated, confirm adequate treatment of sleep apnea.  Discussed importance of optimizing mask " fit.    Plan:     Continue auto titrating CPAP therapy with a pressure range of 8 to 15 cm H2O    Efraín Estrada will follow up in about 1 year(s).       Gurwinder Roberto MD    CC:  Carlos Alberto Hunter,

## 2023-10-11 ENCOUNTER — NURSE TRIAGE (OUTPATIENT)
Dept: INTERNAL MEDICINE | Facility: CLINIC | Age: 58
End: 2023-10-11
Payer: COMMERCIAL

## 2023-10-11 DIAGNOSIS — R31.9 HEMATURIA, UNSPECIFIED TYPE: Primary | ICD-10-CM

## 2023-10-11 NOTE — TELEPHONE ENCOUNTER
Reason for Call:  Appointment Request    Patient requesting this type of appt:  Wes color urine     Requested provider: Carlos Alberto Hunter    Reason patient unable to be scheduled: Not within requested timeframe    When does patient want to be seen/preferred time: Same day    Comments: He has been having wes colored urine     Could we [place] this information to you [place] or would you prefer to receive a phone call?:   Patient would prefer a phone call   Okay to leave a detailed message?: Yes at Cell number on file:    Telephone Information:   Mobile [phone]       Call taken on 10/11/2023 at 5:35 PM by Maria G Boudreaux

## 2023-10-12 NOTE — TELEPHONE ENCOUNTER
"Nurse Triage SBAR    Is this a 2nd Level Triage? NO    Situation: Pt called the clinic to report \"wes\" pink urine.     Background: The first occurrence was 3 days ago. It has happened 4 times over the last 3 days, on and off. Pt does report he has a slight umbilical hernia that he has had for years without any pain/issues.     Assessment: Pt denies any other symptoms such as abdominal pain, fever, or burning/pain with urination.     Protocol Recommended Disposition:   See in Office Today or Tomorrow    Recommendation: No appt available today or tomorrow with PCP or [facility] same-day provider. Pt would prefer to be seen at the [facility] and does not want to go to . Routing to PCP to advise.   Okay for pt to be seen next week on 10/20 in \"one week\" slot with PCP?    Routed to provider    Does the patient meet one of the following criteria for ADS visit consideration? 16+ years old, with an [facility] PCP     1. COLOR of URINE: \"Describe the color of the urine.\"  (e.g., tea-colored, pink, red, bloody) \"Do you have blood clots in your urine?\" (e.g., none, pea, grape, small coin)      Pink  2. ONSET: \"When did the bleeding start?\"       Tuesday  3. EPISODES: \"How many times has there been blood in the urine?\" or \"How many times today?\"      4  4. PAIN with URINATION: \"Is there any pain with passing your urine?\" If Yes, ask: \"How bad is the pain?\"  (Scale 1-10; or mild, moderate, severe)     - MILD: Complains slightly about urination hurting.     - MODERATE: Interferes with normal activities.       - SEVERE: Excruciating, unwilling or unable to urinate because of the pain.       none  5. FEVER: \"Do you have a fever?\" If Yes, ask: \"What is your temperature, how was it measured, and when did it start?\"      none  6. ASSOCIATED SYMPTOMS: \"Are you passing urine more frequently than usual?\"      Yes, a little bit  7. OTHER SYMPTOMS: \"Do you have any other symptoms?\" (e.g., back/flank pain, abdomen pain, vomiting)      " "none  8. PREGNANCY: \"Is there any chance you are pregnant?\" \"When was your last menstrual period?\"      N/A    Reason for Disposition   All other patients with blood in urine  (Exception: Could be normal menstrual bleeding.)    Additional Information   Negative: Shock suspected (e.g., cold/pale/clammy skin, too weak to stand, low BP, rapid pulse)   Negative: Sounds like a life-threatening emergency to the triager   Negative: Urinary catheter, questions about   Negative: Recent back or abdominal injury   Negative: Recent genital injury   Negative: Unable to urinate (or only a few drops) > 4 hours and bladder feels very full (e.g., palpable bladder or strong urge to urinate)   Negative: Diffuse (all over) muscle pains in the shoulders, arms, legs, and back and dark (cola or tea-colored) or red-colored urine   Negative: Passing pure blood or large blood clots (i.e., larger than a dime or grape)   Negative: Fever > 100.4 F (38.0 C)   Negative: Patient sounds very sick or weak to the triager   Negative: Known sickle cell disease   Negative: Taking Coumadin (warfarin) or other strong blood thinner, or known bleeding disorder (e.g., thrombocytopenia)   Negative: Side (flank) or back pain present   Negative: Pain or burning with passing urine (urination)   Negative: Patient wants to be seen   Negative: Pink or red-colored urine and likely from food (beets, rhubarb, red food dye) and lasts > 24 hours after stopping food    Protocols used: Urine - Blood In-A-OH  Thank you,  Gisella Gonzalez RN    "

## 2023-10-12 NOTE — TELEPHONE ENCOUNTER
Called and left pt detailed VM with provider message below, and asked him to call the clinic back to get scheduled for labs and a VV.     Will postpone to follow-up.     Thank you,  Gisella Gonzalez RN

## 2023-10-12 NOTE — TELEPHONE ENCOUNTER
Per note, pt not having acute pain or fever issues currently.  Schedule pt for lab appt for today or tomorrow to come into lab and  give urine sample at clinic lab as ordered tand also  schedule pt for virtual video visit with me for next  Monday 10/16 at 4:00 if appt slot  still open to review lab results and discuss further management based on urinalysis results and further information gathered from pt at next week's appt.  If 10/16 appt slot already filled when message reviewed by triage, then schedule pt to see me Tues 10/17 instead in open virtual spot

## 2023-10-13 ENCOUNTER — LAB (OUTPATIENT)
Dept: LAB | Facility: CLINIC | Age: 58
End: 2023-10-13
Payer: COMMERCIAL

## 2023-10-13 DIAGNOSIS — R31.9 HEMATURIA, UNSPECIFIED TYPE: ICD-10-CM

## 2023-10-13 LAB
ALBUMIN UR-MCNC: NEGATIVE MG/DL
APPEARANCE UR: CLEAR
BACTERIA #/AREA URNS HPF: ABNORMAL /HPF
BILIRUB UR QL STRIP: NEGATIVE
COLOR UR AUTO: YELLOW
GLUCOSE UR STRIP-MCNC: NEGATIVE MG/DL
HGB UR QL STRIP: ABNORMAL
KETONES UR STRIP-MCNC: NEGATIVE MG/DL
LEUKOCYTE ESTERASE UR QL STRIP: NEGATIVE
NITRATE UR QL: NEGATIVE
PH UR STRIP: 6 [PH] (ref 5–7)
RBC #/AREA URNS AUTO: ABNORMAL /HPF
SP GR UR STRIP: 1.01 (ref 1–1.03)
SQUAMOUS #/AREA URNS AUTO: ABNORMAL /LPF
UROBILINOGEN UR STRIP-ACNC: 0.2 E.U./DL
WBC #/AREA URNS AUTO: ABNORMAL /HPF

## 2023-10-13 PROCEDURE — 81001 URINALYSIS AUTO W/SCOPE: CPT

## 2023-10-16 ENCOUNTER — VIRTUAL VISIT (OUTPATIENT)
Dept: INTERNAL MEDICINE | Facility: CLINIC | Age: 58
End: 2023-10-16
Payer: COMMERCIAL

## 2023-10-16 DIAGNOSIS — R80.9 PROTEINURIA, UNSPECIFIED TYPE: ICD-10-CM

## 2023-10-16 DIAGNOSIS — R94.4 ABNORMAL RENAL FUNCTION TEST: ICD-10-CM

## 2023-10-16 DIAGNOSIS — R97.20 ELEVATED PROSTATE SPECIFIC ANTIGEN (PSA): ICD-10-CM

## 2023-10-16 DIAGNOSIS — Z87.898 HISTORY OF URINE COLOR CHANGES: Primary | ICD-10-CM

## 2023-10-16 PROCEDURE — 99213 OFFICE O/P EST LOW 20 MIN: CPT | Mod: VID | Performed by: INTERNAL MEDICINE

## 2023-10-16 NOTE — PROGRESS NOTES
Efraín is a 58 year old who is being evaluated via a billable video visit.      How would you like to obtain your AVS? [employer]  If the video visit is dropped, the invitation should be resent by: Text to cell phone: [phone]  Will anyone else be joining your video visit? No          ASSESSMENT:    1. History of urine color changes  Likely related to dehydration.  Current urinalysis without evidence of hematuria.  Improve hydration.  Recheck urinalysis 3 months  - UA with Microscopic reflex to Culture - lab collect; Future    2. Elevated prostate specific antigen (PSA)  PSA improving and almost back to normal.  Repeat 3 months  - Prostate Specific Antigen Screen; Future    3. Proteinuria, unspecified type  Blood pressure has been at goal.  Maximum dose ARB.  Recheck urine protein in 3 months.  If remains elevated, will add additional blood pressure control  - Albumin Random Urine Quantitative with Creat Ratio; Future    4. Abnormal renal function test  Minimally reduced GFR.  Improve hydration as above and recheck lab 3 months  - Basic metabolic panel; Future  - UA with Microscopic reflex to Culture - lab collect; Future      PLAN:  Increase hydration with a couple glasses of water per day  Call  [phone] or use [employer] to schedule a future lab appointment  non-fasting in February 2024         Video-Visit Details    Type of service:  Video Visit    Video Start Time: 4:24 PM    Video End Time: 4:40 PM    Originating Location (pt. Location): Home    Distant Location (provider location):  [facility]     Platform used for Video Visit: Darrick Hunter MD  Internal Medicine Department  [facility]  Internal Medicine Department      (Chart documentation was completed, in part, with [employer] voice-recognition software. Even though reviewed, some grammatical, spelling, and word errors may remain.)         Subjective   Efraín is a 58 year old,  presenting for the following health issues:  Urinary Problem      History of Present Illness       Reason for visit:  Dark colored urine  Symptom onset:  1-3 days ago  Symptoms include:  Dark colored urine.  Symptom intensity:  Mild  Symptom progression:  Improving  Had these symptoms before:  No  What makes it worse:  No  What makes it better:  No    He eats 0-1 servings of fruits and vegetables daily.He consumes 1 sweetened beverage(s) daily.He exercises with enough effort to increase his heart rate 30 to 60 minutes per day.  He exercises with enough effort to increase his heart rate 5 days per week.   He is taking medications regularly.         Most recent lab results reviewed with pt.      HPI:  Had episode 5 days ago of dark urine that was Tea colored.  No bright red blood seen.  Symptoms have normalized since that time.  Normal colored stools.  Breaks after 30 minutes for exercise without chest pain, shortness of breath, abdominal pain.  Denies groin pain.  No acute back pain.  Thinks he can improve hydration better.  No dysuria.  Urinalysis without evidence of hematuria.  On blood pressure medication.  Blood pressure check with sleep clinic visit last month was okay     Additional ROS:   Constitutional, HEENT, Cardiovascular, Pulmonary, GI and , Neuro, MSK and Psych review of systems/symptoms are otherwise negative or unchanged from previous, except as noted above.           Objective :  No vitals obtained today    Physical Exam:  GENERAL:  alert and no distress  EYES: Eyes grossly normal to inspection, conjunctivae and sclerae normal  RESP: no audible wheeze, cough, or visible cyanosis.  No visible retractions or increased work of breathing.  Able to speak fully in complete sentences.  NEURO: Cranial nerves grossly intact, mentation intact and speech normal  PSYCH: mentation appears normal, affect normal/bright, judgement and insight intact, normal speech and appearance well-groomed

## 2023-10-19 NOTE — PATIENT INSTRUCTIONS
PLAN:  Increase hydration with a couple glasses of water per day  Call  [phone] or use [employer] to schedule a future lab appointment  non-fasting in February 2024

## 2023-12-05 ENCOUNTER — TELEPHONE (OUTPATIENT)
Dept: SLEEP MEDICINE | Facility: CLINIC | Age: 58
End: 2023-12-05
Payer: COMMERCIAL

## 2024-01-28 ENCOUNTER — HEALTH MAINTENANCE LETTER (OUTPATIENT)
Age: 59
End: 2024-01-28

## 2024-03-06 ENCOUNTER — PATIENT OUTREACH (OUTPATIENT)
Dept: CARE COORDINATION | Facility: CLINIC | Age: 59
End: 2024-03-06
Payer: COMMERCIAL

## 2024-03-06 NOTE — PROGRESS NOTES
Clinic Care Coordination Contact  Program:   [facility]: [place]   Renewal: [facility]   Date Applied:      [facility] Outreach:   3/6/24:  CTA called to see if patient needed assistance with their [employer] Renewal. Patient declined needing assistance and no follow up needed   Priscilla Velazco  Care   [facility]  Clinic Care Coordination  [phone]         Health Insurance:        Referral/Screening:

## 2024-07-12 DIAGNOSIS — I10 HYPERTENSION, UNSPECIFIED TYPE: ICD-10-CM

## 2024-07-13 RX ORDER — LOSARTAN POTASSIUM AND HYDROCHLOROTHIAZIDE 25; 100 MG/1; MG/1
1 TABLET ORAL DAILY
Qty: 90 TABLET | Refills: 0 | Status: SHIPPED | OUTPATIENT
Start: 2024-07-13 | End: 2024-09-04

## 2024-07-13 RX ORDER — AMLODIPINE BESYLATE 10 MG/1
10 TABLET ORAL DAILY
Qty: 90 TABLET | Refills: 0 | Status: SHIPPED | OUTPATIENT
Start: 2024-07-13 | End: 2024-09-04

## 2024-09-04 ENCOUNTER — OFFICE VISIT (OUTPATIENT)
Dept: INTERNAL MEDICINE | Facility: CLINIC | Age: 59
End: 2024-09-04
Payer: COMMERCIAL

## 2024-09-04 VITALS
SYSTOLIC BLOOD PRESSURE: 124 MMHG | OXYGEN SATURATION: 99 % | HEIGHT: 72 IN | WEIGHT: 315 LBS | TEMPERATURE: 97.5 F | BODY MASS INDEX: 42.66 KG/M2 | DIASTOLIC BLOOD PRESSURE: 74 MMHG | HEART RATE: 83 BPM

## 2024-09-04 DIAGNOSIS — G47.33 OSA (OBSTRUCTIVE SLEEP APNEA): ICD-10-CM

## 2024-09-04 DIAGNOSIS — K43.9 VENTRAL HERNIA WITHOUT OBSTRUCTION OR GANGRENE: ICD-10-CM

## 2024-09-04 DIAGNOSIS — N18.31 STAGE 3A CHRONIC KIDNEY DISEASE (H): ICD-10-CM

## 2024-09-04 DIAGNOSIS — I10 HYPERTENSION, UNSPECIFIED TYPE: ICD-10-CM

## 2024-09-04 DIAGNOSIS — M54.9 BACK PAIN, UNSPECIFIED BACK LOCATION, UNSPECIFIED BACK PAIN LATERALITY, UNSPECIFIED CHRONICITY: ICD-10-CM

## 2024-09-04 DIAGNOSIS — E66.01 MORBID OBESITY (H): ICD-10-CM

## 2024-09-04 DIAGNOSIS — Z12.5 SCREENING FOR PROSTATE CANCER: ICD-10-CM

## 2024-09-04 LAB
ANION GAP SERPL CALCULATED.3IONS-SCNC: 11 MMOL/L (ref 7–15)
BUN SERPL-MCNC: 24.2 MG/DL (ref 6–20)
CALCIUM SERPL-MCNC: 9.7 MG/DL (ref 8.8–10.4)
CHLORIDE SERPL-SCNC: 101 MMOL/L (ref 98–107)
CREAT SERPL-MCNC: 1.42 MG/DL (ref 0.67–1.17)
CREAT UR-MCNC: 169 MG/DL
EGFRCR SERPLBLD CKD-EPI 2021: 57 ML/MIN/1.73M2
GLUCOSE SERPL-MCNC: 106 MG/DL (ref 70–99)
HCO3 SERPL-SCNC: 27 MMOL/L (ref 22–29)
MICROALBUMIN UR-MCNC: 151 MG/L
MICROALBUMIN/CREAT UR: 89.35 MG/G CR (ref 0–17)
POTASSIUM SERPL-SCNC: 4.1 MMOL/L (ref 3.4–5.3)
PSA SERPL DL<=0.01 NG/ML-MCNC: 4.64 NG/ML (ref 0–3.5)
SODIUM SERPL-SCNC: 139 MMOL/L (ref 135–145)

## 2024-09-04 PROCEDURE — 36415 COLL VENOUS BLD VENIPUNCTURE: CPT | Performed by: INTERNAL MEDICINE

## 2024-09-04 PROCEDURE — G0103 PSA SCREENING: HCPCS | Performed by: INTERNAL MEDICINE

## 2024-09-04 PROCEDURE — 80048 BASIC METABOLIC PNL TOTAL CA: CPT | Performed by: INTERNAL MEDICINE

## 2024-09-04 PROCEDURE — 82043 UR ALBUMIN QUANTITATIVE: CPT | Performed by: INTERNAL MEDICINE

## 2024-09-04 PROCEDURE — 82570 ASSAY OF URINE CREATININE: CPT | Performed by: INTERNAL MEDICINE

## 2024-09-04 PROCEDURE — G2211 COMPLEX E/M VISIT ADD ON: HCPCS | Performed by: INTERNAL MEDICINE

## 2024-09-04 PROCEDURE — 99214 OFFICE O/P EST MOD 30 MIN: CPT | Performed by: INTERNAL MEDICINE

## 2024-09-04 RX ORDER — LOSARTAN POTASSIUM AND HYDROCHLOROTHIAZIDE 25; 100 MG/1; MG/1
1 TABLET ORAL DAILY
Qty: 90 TABLET | Refills: 3 | Status: SHIPPED | OUTPATIENT
Start: 2024-09-04

## 2024-09-04 RX ORDER — AMLODIPINE BESYLATE 10 MG/1
10 TABLET ORAL DAILY
Qty: 90 TABLET | Refills: 3 | Status: SHIPPED | OUTPATIENT
Start: 2024-09-04

## 2024-09-04 NOTE — PATIENT INSTRUCTIONS
Continue current medications  Prescriptions refilled at your pharmacy.    Labs today as ordered  Reduce calorie/carbohydrate (sugar, bread, potato, pasta, rice, alcohol etc)  intake in diet.  Increase color on your plate with vegetables. Increase  frequency of walking or other aerobic exercise as able (goal is daily)  Referral to    General Surgery re: probable hernia.  Central schedulers will contact you to schedule an appointment.  If you don't hear from a representative within 2 business days, please call [phone].   Referral to  [facility]  physical therapy. Central scheduling will call you to schedule the appointment. If you have not heard from the scheduling office within 2 business days, please call [phone]    I would recommend an updated covid vaccination and an influenza/flu vaccination in the Fall (October) 2024 at the clinic or any pharmacy

## 2024-09-04 NOTE — PROGRESS NOTES
ASSESSMENT:   1. Hypertension, unspecified type  Controlled.  Continue current medication.  Lab today as ordered  - losartan-hydrochlorothiazide (HYZAAR) 100-25 MG tablet; Take 1 tablet by mouth daily.  Dispense: 90 tablet; Refill: 3  - amLODIPine (NORVASC) 10 MG tablet; Take 1 tablet (10 mg) by mouth daily.  Dispense: 90 tablet; Refill: 3  - Basic metabolic panel; Future  - Albumin Random Urine Quantitative with Creat Ratio; Future  - Basic metabolic panel  - Albumin Random Urine Quantitative with Creat Ratio    2. Back pain, unspecified back location, unspecified back pain laterality, unspecified chronicity  Chronic intermittent issue.  No radicular symptoms or bowel/bladder incontinence.  Will refer to physical therapy  - Physical Therapy  Referral; Future    3. Morbid obesity (H)  Weight up 18 pounds.  Contributing to hypertension, back pain, sleep apnea, etc.  Counseled regarding calorie/carbohydrate reduction.  Walking exercise as able    4. Ventral hernia without obstruction or gangrene  Increasing in size per patient and intermittent discomfort.  Appears most likely to be hernia rather than rectus diastasis. Will get general surgery opinion  - Adult Gen Surg  Referral; Future    5. Stage 3a chronic kidney disease (H)  Previously stable.  Needs lab follow-up.  Counseled regarding avoiding NSAIDs  - Basic metabolic panel; Future  - Albumin Random Urine Quantitative with Creat Ratio; Future  - Basic metabolic panel  - Albumin Random Urine Quantitative with Creat Ratio    6. NEGIN (obstructive sleep apnea)  Compliant with CPAP use    7. Screening for prostate cancer  Candidate for screening. Most recent PSA  with mild elevation 4.03, better than prior PSA result. Denies hesitancy, dysuria  - Prostate Specific Antigen Screen; Future  - Prostate Specific Antigen Screen        PLAN:  Continue current medications  Prescriptions refilled at your pharmacy.    Labs today as ordered  Reduce  calorie/carbohydrate (sugar, bread, potato, pasta, rice, alcohol etc)  intake in diet.  Increase color on your plate with vegetables. Increase  frequency of walking or other aerobic exercise as able (goal is daily)  Referral to    General Surgery re: probable hernia.  Central schedulers will contact you to schedule an appointment.  If you don't hear from a representative within 2 business days, please call [phone].   Referral to  [facility]  physical therapy. Central scheduling will call you to schedule the appointment. If you have not heard from the scheduling office within 2 business days, please call [phone]    I would recommend an updated covid vaccination and an influenza/flu vaccination in the Fall (October) 2024 at the clinic or any pharmacy           Travis Kenny is a 58 year old, presenting for the following health issues:  Hypertension and Back Pain    History of Present Illness       Back Pain:  He presents for follow up of back pain. Patient's back pain is a recurring problem.  Location of back pain:  Right lower back and left upper back  Description of back pain: cramping  Back pain spreads: nowhere    Since patient first noticed back pain, pain is: always present, but gets better and worse  Does back pain interfere with his job:  Yes       Hypertension: He presents for follow up of hypertension.  He does check blood pressure  regularly outside of the clinic. Outpatient blood pressures have not been over 140/90. He follows a low salt diet.     He eats 2-3 servings of fruits and vegetables daily.He consumes 0 sweetened beverage(s) daily.He exercises with enough effort to increase his heart rate 30 to 60 minutes per day.  He exercises with enough effort to increase his heart rate 4 days per week.   He is taking medications regularly.       Most recent lab results reviewed with pt.      Component      Latest Ref Rng 6/19/2023  8:04 AM 8/24/2023  1:34 PM 8/24/2023  1:41 PM   Sodium       136 - 145 mmol/L 141  139     Potassium      3.4 - 5.3 mmol/L 4.4  4.2     Chloride      98 - 107 mmol/L 103  101     Carbon Dioxide (CO2)      22 - 29 mmol/L 26  27     Anion Gap      7 - 15 mmol/L 12  11     Urea Nitrogen      6.0 - 20.0 mg/dL 18.4  18.5     Creatinine      0.67 - 1.17 mg/dL 1.46 (H)  1.46 (H)     Calcium      8.6 - 10.0 mg/dL 9.8  9.6     Glucose      70 - 99 mg/dL 101 (H)  98     Alkaline Phosphatase      40 - 129 U/L 73      AST      0 - 45 U/L 20      ALT      0 - 70 U/L 21      Protein Total      6.4 - 8.3 g/dL 7.8      Albumin      3.5 - 5.2 g/dL 4.6      Bilirubin Total      <=1.2 mg/dL 0.2      GFR Estimate      >60 mL/min/1.73m2 56 (L)  56 (L)     Cholesterol      <200 mg/dL 174      Triglycerides      <150 mg/dL 74      HDL Cholesterol      >=40 mg/dL 61      LDL Cholesterol Calculated      <=100 mg/dL 98      Non HDL Cholesterol      <130 mg/dL 113      Creatinine Urine      mg/dL   161.0    Albumin Urine mg/L      mg/L   69.5    Albumin Urine mg/g Cr      0.00 - 17.00 mg/g Cr   43.17 (H)    PSA Tumor Marker      0.00 - 3.50 ng/mL 4.11 (H)  4.03 (H)        Compliant using CPAP every night.  Energy okay during the day  Weight up 18 pounds in 1 year.  History of intermittent low back pain right lumbar area.  No radiation to the lower extremities.    No bowel or bladder incontinence.  Normal respiratory weakness.  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  Has missed about 4 to 5 days of work in the last 9 months due to back pain issues.  Mild discomfort only today.  No trauma to the back.  Has a midline abdominal hernia that causes intermittent discomfort.  Denies constipation.  Feels like it is getting bigger.  History of mild PSA elevation as above.  Due for follow-up  History mild proteinuria and CKD.  Due for follow-up.  Denies NSAIDs use      Additional ROS:   Constitutional, HEENT, Cardiovascular,  Pulmonary, GI and , Neuro, MSK and Psych review of systems/symptoms are otherwise negative or unchanged from previous, except as noted above.      OBJECTIVE:  /74   Pulse 83   Temp 97.5  F (36.4  C) (Temporal)   Ht 1.829 m (6')   Wt 147 kg (324 lb 1.6 oz)   SpO2 99%   BMI 43.96 kg/m     Estimated body mass index is 43.96 kg/m  as calculated from the following:    Height as of this encounter: 1.829 m (6').    Weight as of this encounter: 147 kg (324 lb 1.6 oz).     Neck: no adenopathy. Thyroid normal to palpation. No bruits  Pulm: Lungs clear to auscultation   CV: Regular rates and rhythm  GI: Soft, obese, mildly tender to palpation over ventral midline abdominal hernia measuring approximately 10 x 8 cm.  No rebound or guarding.  Normal active bowel sounds, No hepatosplenomegaly or masses palpable  Ext: Peripheral pulses intact. Minimal BLE ankle nonpitting edema.  Neuro: Normal strength and tone, sensory exam grossly normal  Back: Mild tenderness to palpation right paralumbar musculature.  Negative straight leg raise test bilaterally       (Chart documentation was completed, in part, with [employer] voice-recognition software. Even though reviewed, some grammatical, spelling, and word errors may remain.)    Carlos Alberto Hunter MD  Internal Medicine Department  [facility]

## 2024-09-08 PROBLEM — N18.31 STAGE 3A CHRONIC KIDNEY DISEASE (H): Status: ACTIVE | Noted: 2024-09-08

## 2024-09-08 PROBLEM — M54.9 BACK PAIN, UNSPECIFIED BACK LOCATION, UNSPECIFIED BACK PAIN LATERALITY, UNSPECIFIED CHRONICITY: Status: ACTIVE | Noted: 2024-09-08

## 2024-09-08 PROBLEM — K43.9 VENTRAL HERNIA WITHOUT OBSTRUCTION OR GANGRENE: Status: ACTIVE | Noted: 2024-09-08

## 2024-09-12 ENCOUNTER — OFFICE VISIT (OUTPATIENT)
Dept: SURGERY | Facility: CLINIC | Age: 59
End: 2024-09-12
Payer: COMMERCIAL

## 2024-09-12 VITALS
HEIGHT: 72 IN | BODY MASS INDEX: 42.66 KG/M2 | HEART RATE: 94 BPM | WEIGHT: 315 LBS | OXYGEN SATURATION: 97 % | SYSTOLIC BLOOD PRESSURE: 120 MMHG | DIASTOLIC BLOOD PRESSURE: 80 MMHG

## 2024-09-12 DIAGNOSIS — K43.9 VENTRAL HERNIA WITHOUT OBSTRUCTION OR GANGRENE: ICD-10-CM

## 2024-09-12 PROCEDURE — 99244 OFF/OP CNSLTJ NEW/EST MOD 40: CPT | Performed by: SURGERY

## 2024-09-12 NOTE — LETTER
September 16, 2024          Carlos Alberto Hunter MD  [address] [place] [address]      RE:   Efraín Estrada 1965      Dear Colleague,    Thank you for referring your patient, Efraín Estrdaa, to [facility] [place] [facility] [place] Please see a copy of my visit note below.     Patient presents as a referral from the above provider for consultation regarding ventral hernia.  He reports that he first noted something in approximately 2017.  He had noted some aching discomfort particularly with activities of increased abdominal pressure including coughing and sneezing.  He has since developed a more noticeable bulge.  This causes occasional mild discomfort.  No signs or symptoms of incarceration or strangulation.  No GI or bowel obstruction symptoms.     PMH:   has a past medical history of HTN (hypertension), Morbid obesity (H) (4/24/2022), NEGIN (obstructive sleep apnea) (4/24/2022), and Tibia fracture (2001).  PSH:    has a past surgical history that includes CLOSED RX TIBIA SHAFT FX (2001) and tonsillectomy (11/2012).  Social History:   reports that he quit smoking about 2 years ago. His smoking use included cigarettes. He started smoking about 28 years ago. He has a 6.3 pack-year smoking history. He has never used smokeless tobacco. He reports current alcohol use of about 12.0 standard drinks of alcohol per week. He reports that he does not use drugs.  Family History:  family history includes Cancer in his father and mother; Unknown/Adopted in his son.  Medications/Allergies: Home medications and allergies reviewed.     ROS:  The 10 point Review of Systems is negative other than noted in the HPI.     Physical Exam:  /80   Pulse 94   Ht 1.829 m (6')   Wt 147 kg (324 lb)   SpO2 97%   BMI 43.94 kg/m    GENERAL: Generally appears well.  Psych: Alert and Oriented.  Normal affect  Eyes: Sclera clear  Respiratory:  Lungs clear to ausculation bilaterally with good air  excursion  Cardiovascular:  Regular Rate and Rhythm with no murmurs gallops or rubs, normal peripheral pulses  GI: Abdomen Non Distended Soft Non-Tender ventral hernia palpated.  Lymphatic/Hematologic/Immune:  No femoral or cervical lymphadenopathy.  Integumentary:  No rashes  Neurological: grossly intact      All new lab and imaging data was reviewed.      Impression and Plan:  Patient is a 58 year old male with ventral hernia     PLAN: Recommend robotic assisted laparoscopic repair at his convenience  I discussed the pathophysiology of hernias and options for repair including laparoscopic VS open.  The risks associated with the procedure including, but not limited to, recurrence, nerve entrapment or injury, persistence of pain, injury to the bowel/bladder, infertility, hematoma, mesh migration, mesh infection, MI, and PE were discussed with the patient. He indicated understanding of the discussion, asked appropriate questions, and provided consent. Signs and symptoms of incarceration were discussed. If these develop in the interim, he promises to call or go straight to the ER. I have provided the patient with an information        Again, thank you for allowing me to participate in the care of your patient.      Sincerely,      Michael Flor MD

## 2024-09-13 ENCOUNTER — TELEPHONE (OUTPATIENT)
Dept: SURGERY | Facility: CLINIC | Age: 59
End: 2024-09-13
Payer: COMMERCIAL

## 2024-09-13 NOTE — TELEPHONE ENCOUNTER
Type of surgery: Robotic assisted laparoscopic ventral hernia repair  Location of surgery: [facility]  Date and time of surgery: 11/12/24 at 1:30pm  Surgeon: Dr. Michael Flor  Pre-Op Appt Date: patient to schedule  Post-Op Appt Date: patient to schedule   Packet sent out: Yes  Pre-cert/Authorization completed:  Not Applicable  Date: 9/13/24

## 2024-09-16 NOTE — PROGRESS NOTES
[facility]  Surgery Consultation    CONSULTATION REQUESTED BY:  Carlos Alberto Hunter [phone]    HPI: Patient presents as a referral from the above provider for consultation regarding ventral hernia.  He reports that he first noted something in approximately 2017.  He had noted some aching discomfort particularly with activities of increased abdominal pressure including coughing and sneezing.  He has since developed a more noticeable bulge.  This causes occasional mild discomfort.  No signs or symptoms of incarceration or strangulation.  No GI or bowel obstruction symptoms.    PMH:   has a past medical history of HTN (hypertension), Morbid obesity (H) (4/24/2022), NEGIN (obstructive sleep apnea) (4/24/2022), and Tibia fracture (2001).  PSH:    has a past surgical history that includes CLOSED RX TIBIA SHAFT FX (2001) and tonsillectomy (11/2012).  Social History:   reports that he quit smoking about 2 years ago. His smoking use included cigarettes. He started smoking about 28 years ago. He has a 6.3 pack-year smoking history. He has never used smokeless tobacco. He reports current alcohol use of about 12.0 standard drinks of alcohol per week. He reports that he does not use drugs.  Family History:  family history includes Cancer in his father and mother; Unknown/Adopted in his son.  Medications/Allergies: Home medications and allergies reviewed.    ROS:  The 10 point Review of Systems is negative other than noted in the HPI.    Physical Exam:  /80   Pulse 94   Ht 1.829 m (6')   Wt 147 kg (324 lb)   SpO2 97%   BMI 43.94 kg/m    GENERAL: Generally appears well.  Psych: Alert and Oriented.  Normal affect  Eyes: Sclera clear  Respiratory:  Lungs clear to ausculation bilaterally with good air excursion  Cardiovascular:  Regular Rate and Rhythm with no murmurs gallops or rubs, normal peripheral pulses  GI: Abdomen Non Distended Soft Non-Tender ventral hernia palpated.  Lymphatic/Hematologic/Immune:   No femoral or cervical lymphadenopathy.  Integumentary:  No rashes  Neurological: grossly intact     All new lab and imaging data was reviewed.     Impression and Plan:  Patient is a 58 year old male with ventral hernia    PLAN: Recommend robotic assisted laparoscopic repair at his convenience  I discussed the pathophysiology of hernias and options for repair including laparoscopic VS open.  The risks associated with the procedure including, but not limited to, recurrence, nerve entrapment or injury, persistence of pain, injury to the bowel/bladder, infertility, hematoma, mesh migration, mesh infection, MI, and PE were discussed with the patient. He indicated understanding of the discussion, asked appropriate questions, and provided consent. Signs and symptoms of incarceration were discussed. If these develop in the interim, he promises to call or go straight to the ER. I have provided the patient with an information     Thank you very much for this consult.    Michael Flor M.D.  [facility]  [phone]    Please route or send letter to:  Primary Care Provider (PCP) and Referring Provider

## 2024-09-19 ENCOUNTER — PATIENT OUTREACH (OUTPATIENT)
Dept: CARE COORDINATION | Facility: CLINIC | Age: 59
End: 2024-09-19
Payer: COMMERCIAL

## 2024-09-23 DIAGNOSIS — G47.33 OSA (OBSTRUCTIVE SLEEP APNEA): Primary | ICD-10-CM

## 2024-10-25 ENCOUNTER — OFFICE VISIT (OUTPATIENT)
Dept: INTERNAL MEDICINE | Facility: CLINIC | Age: 59
End: 2024-10-25
Payer: COMMERCIAL

## 2024-10-25 VITALS
OXYGEN SATURATION: 99 % | SYSTOLIC BLOOD PRESSURE: 120 MMHG | HEIGHT: 72 IN | HEART RATE: 83 BPM | WEIGHT: 315 LBS | DIASTOLIC BLOOD PRESSURE: 84 MMHG | BODY MASS INDEX: 42.66 KG/M2 | TEMPERATURE: 97.9 F

## 2024-10-25 DIAGNOSIS — R97.20 ELEVATED PROSTATE SPECIFIC ANTIGEN (PSA): ICD-10-CM

## 2024-10-25 DIAGNOSIS — E66.01 MORBID OBESITY (H): ICD-10-CM

## 2024-10-25 DIAGNOSIS — I10 HYPERTENSION, UNSPECIFIED TYPE: ICD-10-CM

## 2024-10-25 DIAGNOSIS — G47.33 OSA (OBSTRUCTIVE SLEEP APNEA): ICD-10-CM

## 2024-10-25 DIAGNOSIS — K43.9 VENTRAL HERNIA WITHOUT OBSTRUCTION OR GANGRENE: ICD-10-CM

## 2024-10-25 DIAGNOSIS — Z23 NEED FOR COVID-19 VACCINE: ICD-10-CM

## 2024-10-25 DIAGNOSIS — N18.31 STAGE 3A CHRONIC KIDNEY DISEASE (H): ICD-10-CM

## 2024-10-25 DIAGNOSIS — Z01.818 PREOPERATIVE EXAMINATION: Primary | ICD-10-CM

## 2024-10-25 LAB
ANION GAP SERPL CALCULATED.3IONS-SCNC: 12 MMOL/L (ref 7–15)
BUN SERPL-MCNC: 19.8 MG/DL (ref 8–23)
CALCIUM SERPL-MCNC: 9.9 MG/DL (ref 8.8–10.4)
CHLORIDE SERPL-SCNC: 102 MMOL/L (ref 98–107)
CREAT SERPL-MCNC: 1.4 MG/DL (ref 0.67–1.17)
EGFRCR SERPLBLD CKD-EPI 2021: 58 ML/MIN/1.73M2
ERYTHROCYTE [DISTWIDTH] IN BLOOD BY AUTOMATED COUNT: 13.7 % (ref 10–15)
GLUCOSE SERPL-MCNC: 97 MG/DL (ref 70–99)
HCO3 SERPL-SCNC: 27 MMOL/L (ref 22–29)
HCT VFR BLD AUTO: 43.1 % (ref 40–53)
HGB BLD-MCNC: 13.6 G/DL (ref 13.3–17.7)
MCH RBC QN AUTO: 27.3 PG (ref 26.5–33)
MCHC RBC AUTO-ENTMCNC: 31.6 G/DL (ref 31.5–36.5)
MCV RBC AUTO: 86 FL (ref 78–100)
PLATELET # BLD AUTO: 229 10E3/UL (ref 150–450)
POTASSIUM SERPL-SCNC: 4.6 MMOL/L (ref 3.4–5.3)
RBC # BLD AUTO: 4.99 10E6/UL (ref 4.4–5.9)
SODIUM SERPL-SCNC: 141 MMOL/L (ref 135–145)
WBC # BLD AUTO: 5.4 10E3/UL (ref 4–11)

## 2024-10-25 PROCEDURE — 85027 COMPLETE CBC AUTOMATED: CPT | Performed by: INTERNAL MEDICINE

## 2024-10-25 PROCEDURE — 36415 COLL VENOUS BLD VENIPUNCTURE: CPT | Performed by: INTERNAL MEDICINE

## 2024-10-25 PROCEDURE — 90480 ADMN SARSCOV2 VAC 1/ONLY CMP: CPT | Performed by: INTERNAL MEDICINE

## 2024-10-25 PROCEDURE — 91320 SARSCV2 VAC 30MCG TRS-SUC IM: CPT | Performed by: INTERNAL MEDICINE

## 2024-10-25 PROCEDURE — 99214 OFFICE O/P EST MOD 30 MIN: CPT | Performed by: INTERNAL MEDICINE

## 2024-10-25 PROCEDURE — 80048 BASIC METABOLIC PNL TOTAL CA: CPT | Performed by: INTERNAL MEDICINE

## 2024-10-25 NOTE — PATIENT INSTRUCTIONS
Approval given to proceed with proposed procedure, without further diagnostic evaluation.  No solid food after midnight prior to your  procedure. May have clear liquids up to  11:00am  May use Tylenol for pain control between now and surgery  Take  Amlodipine with   water  on the  AM of the  procedure   Hold other prescription and over the counter medications/supplements the day of the  procedure. May resume usual medications/supplements after the procedure unless instructed otherwise by your surgeon   Call  [phone] or use [employer] to schedule a future lab appointment  non-fasting in 6 month re: prostate and urine protein and kidney.   Reduce calorie/carbohydrate (sugar, bread, potato, pasta, rice, alcohol etc)  intake in diet.  Increase color on your plate with vegetables. Increase  frequency of walking or other aerobic exercise as able     Limit alcohol intake to 2 drinks per sitting and  a total of 7 drinks per week or less  Vaccination given today: Updated covid booster vaccine.  Patient declines influenza vaccination

## 2024-10-25 NOTE — PROGRESS NOTES
Preoperative Evaluation  [address] [address]  Phone: [phone]  Primary Provider: Carlos Alberto Hunter MD  Pre-op Performing Provider: Carlos Alberto Hunter MD  Oct 25, 2024            10/20/2024   Surgical Information   What procedure is being done? Ventral Hernia Repair    Facility or Hospital where procedure/surgery will be performed: [facility]    Who is doing the procedure / surgery? Dr. Michael Flor    Date of surgery / procedure: November 12, 2024    Time of surgery / procedure: 11:30 am    Where do you plan to recover after surgery? at home alone        Patient-reported     Fax number for surgical facility: Note does not need to be faxed, will be available electronically in Epic.    Assessment & Plan     The proposed surgical procedure is considered INTERMEDIATE risk.    Preoperative examination  Appears medically stable to proceed with surgery as scheduled. Has 2 beers/day but no hx withdrawal issues  - CBC with platelets; Future  - Basic metabolic panel; Future  - CBC with platelets  - Basic metabolic panel    Ventral hernia without obstruction or gangrene  Hernia reducible.  Intermittent pain but denies currently    Stage 3a chronic kidney disease (H)  Stable with today's lab.  Repeat labs 6 months  - Basic metabolic panel; Future  - Basic metabolic panel  - Albumin Random Urine Quantitative with Creat Ratio; Future  - Basic metabolic panel; Future    NEGIN (obstructive sleep apnea)  Consistent with CPAP use.   Does not have to bring to the hospital for day surgery     Morbid obesity (H)  Contributing to hypertension, sleep apnea.  Counseled regarding calorie/carbohydrate reduction and walking exercise as able once hernia repair healed.  Weight up 16 pounds in 1 year    Hypertension, unspecified type  Controlled.  Continue medical management      Need for COVID-19 vaccine  - COVID-19 12+ (PFIZER)     Elevated prostate specific antigen  (PSA)  Mild PSA elevation 4.64.  Repeat labs 6 months  - Prostate Specific Antigen Screen; Future      Risks and Recommendations  The patient has the following additional risks and recommendations for perioperative complications:   - No identified additional risk factors other than previously addressed    Patient instructions   Approval given to proceed with proposed procedure, without further diagnostic evaluation.  No solid food after midnight prior to your  procedure. May have clear liquids up to  11:00am  May use Tylenol for pain control between now and surgery  Take  Amlodipine with   water  on the  AM of the  procedure   Hold other prescription and over the counter medications/supplements the day of the  procedure. May resume usual medications/supplements after the procedure unless instructed otherwise by your surgeon   Call  [phone] or use [facility] to schedule a future lab appointment  non-fasting in 6 month re: prostate and urine protein and kidney.   Reduce calorie/carbohydrate (sugar, bread, potato, pasta, rice, alcohol etc)  intake in diet.  Increase color on your plate with vegetables. Increase  frequency of walking or other aerobic exercise as able    Limit alcohol intake to 2 drinks per sitting and  a total of 7 drinks per week or less  Vaccination given today: Updated covid booster vaccine.  Patient declines influenza vaccination    Recommendation  Approval given to proceed with proposed procedure, without further diagnostic evaluation.        Travis Kenny is a 59 year old, presenting for the following:  Pre-Op Exam        HPI related to upcoming procedure:   History ventral hernia.  Remains reducible but intermittently symptomatic.  Denies abdominal pain currently.  Presents for clearance to undergo surgical management.  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        10/20/2024    Pre-Op Questionnaire   Have you ever had a heart attack or stroke? No    Have you ever had surgery on your heart or blood vessels, such as a stent placement, a coronary artery bypass, or surgery on an artery in your head, neck, heart, or legs? No    Do you have chest pain with activity? No    Do you have a history of heart failure? No    Do you currently have a cold, bronchitis or symptoms of other infection? No    Do you have a cough, shortness of breath, or wheezing? No    Do you or anyone in your family have previous history of blood clots? No    Do you or does anyone in your family have a serious bleeding problem such as prolonged bleeding following surgeries or cuts? No    Have you ever had problems with anemia or been told to take iron pills? No    Have you had any abnormal blood loss such as black, tarry or bloody stools? No    Have you ever had a blood transfusion? No    Are you willing to have a blood transfusion if it is medically needed before, during, or after your surgery? Yes    Have you or any of your relatives ever had problems with anesthesia? No    Do you have sleep apnea, excessive snoring or daytime drowsiness? (!) YES    Do you have a CPAP machine? Yes    Do you have any artifical heart valves or other implanted medical devices like a pacemaker, defibrillator, or continuous glucose monitor? No    Do you have artificial joints? No    Are you allergic to latex? No        Patient-reported     Health Care Directive  Patient does not have a Health Care Directive: Discussed advance care planning with patient; information given to patient to review.    Preoperative Review of    reviewed - no record of controlled substances prescribed.      Status of Chronic Conditions:  See assessment/plan section above for active medical problems.  Problems all longstanding and stable, except as noted/documented.  See ROS in addition for pertinent symptoms related to these conditions.      Patient Active  Problem List    Diagnosis Date Noted    Back pain, unspecified back location, unspecified back pain laterality, unspecified chronicity 09/08/2024     Priority: Medium    Ventral hernia without obstruction or gangrene 09/08/2024     Priority: Medium    Stage 3a chronic kidney disease (H) 09/08/2024     Priority: Medium    Elevated prostate specific antigen (PSA) 05/23/2022     Priority: Medium    Morbid obesity (H) 04/24/2022     Priority: Medium    NEGIN (obstructive sleep apnea) 04/24/2022     Priority: Medium     5/31/2023 Aline Split Sleep Study (297.0 lbs) - AHI 22.3, RDI 23.2, Supine AHI 22.3, REM AHI 44.7, Low O2% 78.0%, Time Spent <=88% 20.7, Time Spent <=89% 27.6. Treatment was titrated to a pressure of CPAP 9 with an AHI -. Time spent in REM supine at this pressure was - minutes.      HTN (hypertension)      Priority: Medium      Past Medical History:   Diagnosis Date    HTN (hypertension)     Morbid obesity (H) 4/24/2022    NEGIN (obstructive sleep apnea) 4/24/2022    Tibia fracture 2001    left      Past Surgical History:   Procedure Laterality Date    COLONOSCOPY  07/2023    TONSILLECTOMY  11/01/2012    inflammed; causing breathing problems with sleeping     ZZHC CLOSED TX TIBIAL SHAFT FX W/O MANIPULATION  01/01/2001    plates removed since left      Current Outpatient Medications   Medication Sig Dispense Refill    amLODIPine (NORVASC) 10 MG tablet Take 1 tablet (10 mg) by mouth daily. 90 tablet 3    losartan-hydrochlorothiazide (HYZAAR) 100-25 MG tablet Take 1 tablet by mouth daily. 90 tablet 3       Allergies   Allergen Reactions    Lisinopril Cough    Codeine Rash and Hives        Social History     Tobacco Use    Smoking status: Former     Current packs/day: 0.00     Average packs/day: 0.3 packs/day for 35.7 years (8.9 ttl pk-yrs)     Types: Cigarettes     Start date: 1/9/1986     Quit date: 9/19/2021     Years since quitting: 3.1    Smokeless tobacco: Never    Tobacco comments:     Never smoked a  pack a day. Would often go days without smoking. I was a passive smoker.   Substance Use Topics    Alcohol use: Yes     Alcohol/week: 12.0 standard drinks of alcohol     Types: 12 Cans of beer per week     Comment: Couple of beers daily     Family History   Problem Relation Age of Onset    Cancer Mother         ovarian cancer    Cancer Father         stomach and spinal     Unknown/Adopted Son         car accident     Mental Illness Sister     Asthma Brother     Hypertension No family hx of      History   Drug Use No     Comment: smoked 25 years as of 2013             Review of Systems  CONSTITUTIONAL: NEGATIVE for fever, chills.  Weight down 16 pounds in 1 year  INTEGUMENTARY/SKIN: NEGATIVE for worrisome rashes, moles or lesions  EYES: NEGATIVE for vision changes or irritation  ENT/MOUTH: NEGATIVE for ear, mouth and throat problems  RESP: NEGATIVE for significant cough or SOB.  Consistent with CPAP use  CV: NEGATIVE for chest pain, palpitation. Rare peripheral edema.  No orthopnea or PND  GI: NEGATIVE for nausea, abdominal pain, heartburn, or change in bowel habits. See HPI re: ventral hernia  : NEGATIVE for frequency, dysuria, or hematuria  MUSCULOSKELETAL: NEGATIVE for significant arthralgias or myalgia  NEURO: NEGATIVE for weakness, dizziness or paresthesias  ENDOCRINE: NEGATIVE for temperature intolerance, skin/hair changes  HEME: NEGATIVE for bleeding problems  PSYCHIATRIC: NEGATIVE for changes in mood or affect    Objective    /84   Pulse 83   Temp 97.9  F (36.6  C) (Temporal)   Ht 1.829 m (6')   Wt 146.1 kg (322 lb 3.2 oz)   SpO2 99%   BMI 43.70 kg/m     Estimated body mass index is 43.94 kg/m  as calculated from the following:    Height as of 9/12/24: 1.829 m (6').    Weight as of 9/12/24: 147 kg (324 lb).  Physical Exam  Eye: PERRL, EOMI  HENT: ear canals and TM's normal and nose and mouth without ulcers or lesions   Neck: no adenopathy. Thyroid normal to palpation. No bruits  Pulm: Lungs  clear to auscultation   CV: Regular rates and rhythm  GI: Soft, obese, nontender, Normal active bowel sounds, No hepatosplenomegaly or masses palpable.  Moderate size vertical ventral hernia present above umbilicus.  Reducible and nontender  Ext: Peripheral pulses intact.  Minimal BLE ankle  edema.  Neuro: Normal strength and tone, sensory exam grossly normal      Recent Labs   Lab Test 09/04/24  1052      POTASSIUM 4.1   CR 1.42*        Diagnostics  Component      Latest Ref Rng 10/25/2024  3:35 PM   Sodium      135 - 145 mmol/L 141    Potassium      3.4 - 5.3 mmol/L 4.6    Chloride      98 - 107 mmol/L 102    Carbon Dioxide (CO2)      22 - 29 mmol/L 27    Anion Gap      7 - 15 mmol/L 12    Urea Nitrogen      8.0 - 23.0 mg/dL 19.8    Creatinine      0.67 - 1.17 mg/dL 1.40 (H)    GFR Estimate      >60 mL/min/1.73m2 58 (L)    Calcium      8.8 - 10.4 mg/dL 9.9    Glucose      70 - 99 mg/dL 97    WBC      4.0 - 11.0 10e3/uL 5.4    RBC Count      4.40 - 5.90 10e6/uL 4.99    Hemoglobin      13.3 - 17.7 g/dL 13.6    Hematocrit      40.0 - 53.0 % 43.1    MCV      78 - 100 fL 86    MCH      26.5 - 33.0 pg 27.3    MCHC      31.5 - 36.5 g/dL 31.6    RDW      10.0 - 15.0 % 13.7    Platelet Count      150 - 450 10e3/uL 229             No EKG required, no history of coronary heart disease, significant arrhythmia, peripheral arterial disease or other structural heart disease.    Revised Cardiac Risk Index (RCRI)  The patient has the following serious cardiovascular risks for perioperative complications:   - No serious cardiac risks = 0 points     RCRI Interpretation: 0 points: Class I (very low risk - 0.4% complication rate)         Signed Electronically by: Carlos Alberto Hunter MD  A copy of this evaluation report is provided to the requesting physician.

## 2024-11-11 ENCOUNTER — ANESTHESIA EVENT (OUTPATIENT)
Dept: SURGERY | Facility: CLINIC | Age: 59
End: 2024-11-11
Payer: COMMERCIAL

## 2024-11-11 ASSESSMENT — ENCOUNTER SYMPTOMS: SEIZURES: 0

## 2024-11-11 ASSESSMENT — LIFESTYLE VARIABLES: TOBACCO_USE: 1

## 2024-11-12 ENCOUNTER — HOSPITAL ENCOUNTER (OUTPATIENT)
Facility: CLINIC | Age: 59
Discharge: HOME | End: 2024-11-12
Attending: SURGERY | Admitting: SURGERY
Payer: COMMERCIAL

## 2024-11-12 ENCOUNTER — ANESTHESIA (OUTPATIENT)
Dept: SURGERY | Facility: CLINIC | Age: 59
End: 2024-11-12
Payer: COMMERCIAL

## 2024-11-12 VITALS
BODY MASS INDEX: 42.66 KG/M2 | SYSTOLIC BLOOD PRESSURE: 139 MMHG | WEIGHT: 315 LBS | TEMPERATURE: 98 F | HEIGHT: 72 IN | RESPIRATION RATE: 18 BRPM | OXYGEN SATURATION: 93 % | DIASTOLIC BLOOD PRESSURE: 82 MMHG | HEART RATE: 69 BPM

## 2024-11-12 DIAGNOSIS — G89.18 ACUTE POST-OPERATIVE PAIN: Primary | ICD-10-CM

## 2024-11-12 PROCEDURE — C1781 MESH (IMPLANTABLE): HCPCS | Performed by: SURGERY

## 2024-11-12 PROCEDURE — 272N000001 HC OR GENERAL SUPPLY STERILE: Performed by: SURGERY

## 2024-11-12 PROCEDURE — 999N000141 HC STATISTIC PRE-PROCEDURE NURSING ASSESSMENT: Performed by: SURGERY

## 2024-11-12 PROCEDURE — 250N000009 HC RX 250: Performed by: NURSE ANESTHETIST, CERTIFIED REGISTERED

## 2024-11-12 PROCEDURE — 49593 RPR AA HRN 1ST 3-10 RDC: CPT | Performed by: SURGERY

## 2024-11-12 PROCEDURE — 370N000017 HC ANESTHESIA TECHNICAL FEE, PER MIN: Performed by: SURGERY

## 2024-11-12 PROCEDURE — 250N000009 HC RX 250: Performed by: SURGERY

## 2024-11-12 PROCEDURE — 250N000025 HC SEVOFLURANE, PER MIN: Performed by: SURGERY

## 2024-11-12 PROCEDURE — 710N000009 HC RECOVERY PHASE 1, LEVEL 1, PER MIN: Performed by: SURGERY

## 2024-11-12 PROCEDURE — S2900 ROBOTIC SURGICAL SYSTEM: HCPCS | Performed by: SURGERY

## 2024-11-12 PROCEDURE — 250N000011 HC RX IP 250 OP 636: Performed by: STUDENT IN AN ORGANIZED HEALTH CARE EDUCATION/TRAINING PROGRAM

## 2024-11-12 PROCEDURE — 360N000080 HC SURGERY LEVEL 7, PER MIN: Performed by: SURGERY

## 2024-11-12 PROCEDURE — 49593 RPR AA HRN 1ST 3-10 RDC: CPT | Mod: AS | Performed by: PHYSICIAN ASSISTANT

## 2024-11-12 PROCEDURE — 710N000012 HC RECOVERY PHASE 2, PER MINUTE: Performed by: SURGERY

## 2024-11-12 PROCEDURE — 250N000011 HC RX IP 250 OP 636: Performed by: NURSE ANESTHETIST, CERTIFIED REGISTERED

## 2024-11-12 PROCEDURE — 250N000013 HC RX MED GY IP 250 OP 250 PS 637: Performed by: PHYSICIAN ASSISTANT

## 2024-11-12 PROCEDURE — 258N000003 HC RX IP 258 OP 636: Performed by: NURSE ANESTHETIST, CERTIFIED REGISTERED

## 2024-11-12 PROCEDURE — 250N000011 HC RX IP 250 OP 636: Performed by: SURGERY

## 2024-11-12 DEVICE — LAPAROSCOPIC SELF-FIXATING MESH POLYESTER WITH POLYLACTIC ACID GRIPS AND COLLAGEN FILM
Type: IMPLANTABLE DEVICE | Site: ABDOMEN | Status: FUNCTIONAL
Brand: PROGRIP

## 2024-11-12 RX ORDER — GLYCOPYRROLATE 0.2 MG/ML
INJECTION, SOLUTION INTRAMUSCULAR; INTRAVENOUS PRN
Status: DISCONTINUED | OUTPATIENT
Start: 2024-11-12 | End: 2024-11-12

## 2024-11-12 RX ORDER — ONDANSETRON 4 MG/1
4 TABLET, ORALLY DISINTEGRATING ORAL EVERY 30 MIN PRN
Status: DISCONTINUED | OUTPATIENT
Start: 2024-11-12 | End: 2024-11-12 | Stop reason: HOSPADM

## 2024-11-12 RX ORDER — ONDANSETRON 2 MG/ML
4 INJECTION INTRAMUSCULAR; INTRAVENOUS EVERY 30 MIN PRN
Status: DISCONTINUED | OUTPATIENT
Start: 2024-11-12 | End: 2024-11-12 | Stop reason: HOSPADM

## 2024-11-12 RX ORDER — LIDOCAINE HYDROCHLORIDE 20 MG/ML
INJECTION, SOLUTION INFILTRATION; PERINEURAL PRN
Status: DISCONTINUED | OUTPATIENT
Start: 2024-11-12 | End: 2024-11-12

## 2024-11-12 RX ORDER — SODIUM CHLORIDE, SODIUM LACTATE, POTASSIUM CHLORIDE, CALCIUM CHLORIDE 600; 310; 30; 20 MG/100ML; MG/100ML; MG/100ML; MG/100ML
INJECTION, SOLUTION INTRAVENOUS CONTINUOUS PRN
Status: DISCONTINUED | OUTPATIENT
Start: 2024-11-12 | End: 2024-11-12

## 2024-11-12 RX ORDER — SODIUM CHLORIDE, SODIUM LACTATE, POTASSIUM CHLORIDE, CALCIUM CHLORIDE 600; 310; 30; 20 MG/100ML; MG/100ML; MG/100ML; MG/100ML
INJECTION, SOLUTION INTRAVENOUS CONTINUOUS
Status: DISCONTINUED | OUTPATIENT
Start: 2024-11-12 | End: 2024-11-12 | Stop reason: HOSPADM

## 2024-11-12 RX ORDER — DEXAMETHASONE SODIUM PHOSPHATE 4 MG/ML
4 INJECTION, SOLUTION INTRA-ARTICULAR; INTRALESIONAL; INTRAMUSCULAR; INTRAVENOUS; SOFT TISSUE
Status: DISCONTINUED | OUTPATIENT
Start: 2024-11-12 | End: 2024-11-12 | Stop reason: HOSPADM

## 2024-11-12 RX ORDER — FENTANYL CITRATE 50 UG/ML
INJECTION, SOLUTION INTRAMUSCULAR; INTRAVENOUS PRN
Status: DISCONTINUED | OUTPATIENT
Start: 2024-11-12 | End: 2024-11-12

## 2024-11-12 RX ORDER — BUPIVACAINE HYDROCHLORIDE AND EPINEPHRINE 5; 5 MG/ML; UG/ML
INJECTION, SOLUTION PERINEURAL PRN
Status: DISCONTINUED | OUTPATIENT
Start: 2024-11-12 | End: 2024-11-12 | Stop reason: HOSPADM

## 2024-11-12 RX ORDER — DEXMEDETOMIDINE HYDROCHLORIDE 4 UG/ML
INJECTION, SOLUTION INTRAVENOUS PRN
Status: DISCONTINUED | OUTPATIENT
Start: 2024-11-12 | End: 2024-11-12

## 2024-11-12 RX ORDER — AMOXICILLIN 250 MG
1 CAPSULE ORAL 2 TIMES DAILY
Qty: 15 TABLET | Refills: 0 | Status: SHIPPED | OUTPATIENT
Start: 2024-11-12

## 2024-11-12 RX ORDER — ONDANSETRON 2 MG/ML
INJECTION INTRAMUSCULAR; INTRAVENOUS PRN
Status: DISCONTINUED | OUTPATIENT
Start: 2024-11-12 | End: 2024-11-12

## 2024-11-12 RX ORDER — CEFAZOLIN SODIUM/WATER 3 G/30 ML
3 SYRINGE (ML) INTRAVENOUS SEE ADMIN INSTRUCTIONS
Status: DISCONTINUED | OUTPATIENT
Start: 2024-11-12 | End: 2024-11-12 | Stop reason: HOSPADM

## 2024-11-12 RX ORDER — CEFAZOLIN SODIUM/WATER 3 G/30 ML
3 SYRINGE (ML) INTRAVENOUS
Status: COMPLETED | OUTPATIENT
Start: 2024-11-12 | End: 2024-11-12

## 2024-11-12 RX ORDER — DEXAMETHASONE SODIUM PHOSPHATE 4 MG/ML
INJECTION, SOLUTION INTRA-ARTICULAR; INTRALESIONAL; INTRAMUSCULAR; INTRAVENOUS; SOFT TISSUE PRN
Status: DISCONTINUED | OUTPATIENT
Start: 2024-11-12 | End: 2024-11-12

## 2024-11-12 RX ORDER — PROPOFOL 10 MG/ML
INJECTION, EMULSION INTRAVENOUS CONTINUOUS PRN
Status: DISCONTINUED | OUTPATIENT
Start: 2024-11-12 | End: 2024-11-12

## 2024-11-12 RX ORDER — CYCLOBENZAPRINE HCL 10 MG
10 TABLET ORAL 2 TIMES DAILY
Qty: 15 TABLET | Refills: 0 | Status: SHIPPED | OUTPATIENT
Start: 2024-11-12

## 2024-11-12 RX ORDER — FENTANYL CITRATE 50 UG/ML
50 INJECTION, SOLUTION INTRAMUSCULAR; INTRAVENOUS EVERY 5 MIN PRN
Status: DISCONTINUED | OUTPATIENT
Start: 2024-11-12 | End: 2024-11-12 | Stop reason: HOSPADM

## 2024-11-12 RX ORDER — FENTANYL CITRATE 50 UG/ML
25 INJECTION, SOLUTION INTRAMUSCULAR; INTRAVENOUS EVERY 5 MIN PRN
Status: DISCONTINUED | OUTPATIENT
Start: 2024-11-12 | End: 2024-11-12 | Stop reason: HOSPADM

## 2024-11-12 RX ORDER — HYDROMORPHONE HCL IN WATER/PF 6 MG/30 ML
0.2 PATIENT CONTROLLED ANALGESIA SYRINGE INTRAVENOUS EVERY 5 MIN PRN
Status: DISCONTINUED | OUTPATIENT
Start: 2024-11-12 | End: 2024-11-12 | Stop reason: HOSPADM

## 2024-11-12 RX ORDER — NALOXONE HYDROCHLORIDE 0.4 MG/ML
0.1 INJECTION, SOLUTION INTRAMUSCULAR; INTRAVENOUS; SUBCUTANEOUS
Status: DISCONTINUED | OUTPATIENT
Start: 2024-11-12 | End: 2024-11-12 | Stop reason: HOSPADM

## 2024-11-12 RX ORDER — HYDROMORPHONE HCL IN WATER/PF 6 MG/30 ML
0.4 PATIENT CONTROLLED ANALGESIA SYRINGE INTRAVENOUS EVERY 5 MIN PRN
Status: DISCONTINUED | OUTPATIENT
Start: 2024-11-12 | End: 2024-11-12 | Stop reason: HOSPADM

## 2024-11-12 RX ORDER — HYDROCODONE BITARTRATE AND ACETAMINOPHEN 5; 325 MG/1; MG/1
1 TABLET ORAL EVERY 4 HOURS PRN
Qty: 12 TABLET | Refills: 0 | Status: SHIPPED | OUTPATIENT
Start: 2024-11-12

## 2024-11-12 RX ORDER — PROPOFOL 10 MG/ML
INJECTION, EMULSION INTRAVENOUS PRN
Status: DISCONTINUED | OUTPATIENT
Start: 2024-11-12 | End: 2024-11-12

## 2024-11-12 RX ORDER — HYDROCODONE BITARTRATE AND ACETAMINOPHEN 5; 325 MG/1; MG/1
1 TABLET ORAL
Status: COMPLETED | OUTPATIENT
Start: 2024-11-12 | End: 2024-11-12

## 2024-11-12 RX ADMIN — ROCURONIUM BROMIDE 60 MG: 50 INJECTION, SOLUTION INTRAVENOUS at 11:06

## 2024-11-12 RX ADMIN — ROCURONIUM BROMIDE 10 MG: 50 INJECTION, SOLUTION INTRAVENOUS at 11:46

## 2024-11-12 RX ADMIN — DEXMEDETOMIDINE HYDROCHLORIDE 4 MCG: 200 INJECTION INTRAVENOUS at 12:10

## 2024-11-12 RX ADMIN — PROPOFOL 250 MG: 10 INJECTION, EMULSION INTRAVENOUS at 11:06

## 2024-11-12 RX ADMIN — PHENYLEPHRINE HYDROCHLORIDE 100 MCG: 10 INJECTION INTRAVENOUS at 12:10

## 2024-11-12 RX ADMIN — PROPOFOL 30 MCG/KG/MIN: 10 INJECTION, EMULSION INTRAVENOUS at 11:15

## 2024-11-12 RX ADMIN — DEXMEDETOMIDINE HYDROCHLORIDE 4 MCG: 200 INJECTION INTRAVENOUS at 12:15

## 2024-11-12 RX ADMIN — Medication 400 MG: at 12:39

## 2024-11-12 RX ADMIN — DEXMEDETOMIDINE HYDROCHLORIDE 4 MCG: 200 INJECTION INTRAVENOUS at 12:00

## 2024-11-12 RX ADMIN — FENTANYL CITRATE 100 MCG: 50 INJECTION INTRAMUSCULAR; INTRAVENOUS at 11:06

## 2024-11-12 RX ADMIN — ROCURONIUM BROMIDE 20 MG: 50 INJECTION, SOLUTION INTRAVENOUS at 12:20

## 2024-11-12 RX ADMIN — PHENYLEPHRINE HYDROCHLORIDE 100 MCG: 10 INJECTION INTRAVENOUS at 11:31

## 2024-11-12 RX ADMIN — ROCURONIUM BROMIDE 10 MG: 50 INJECTION, SOLUTION INTRAVENOUS at 12:10

## 2024-11-12 RX ADMIN — HYDROMORPHONE HYDROCHLORIDE 0.4 MG: 0.2 INJECTION, SOLUTION INTRAMUSCULAR; INTRAVENOUS; SUBCUTANEOUS at 13:33

## 2024-11-12 RX ADMIN — Medication 3 G: at 11:13

## 2024-11-12 RX ADMIN — SODIUM CHLORIDE, POTASSIUM CHLORIDE, SODIUM LACTATE AND CALCIUM CHLORIDE: 600; 310; 30; 20 INJECTION, SOLUTION INTRAVENOUS at 10:56

## 2024-11-12 RX ADMIN — DEXAMETHASONE SODIUM PHOSPHATE 4 MG: 4 INJECTION, SOLUTION INTRA-ARTICULAR; INTRALESIONAL; INTRAMUSCULAR; INTRAVENOUS; SOFT TISSUE at 11:13

## 2024-11-12 RX ADMIN — GLYCOPYRROLATE 0.2 MG: 0.2 INJECTION, SOLUTION INTRAMUSCULAR; INTRAVENOUS at 11:13

## 2024-11-12 RX ADMIN — SODIUM CHLORIDE, POTASSIUM CHLORIDE, SODIUM LACTATE AND CALCIUM CHLORIDE: 600; 310; 30; 20 INJECTION, SOLUTION INTRAVENOUS at 12:30

## 2024-11-12 RX ADMIN — HYDROCODONE BITARTRATE AND ACETAMINOPHEN 1 TABLET: 5; 325 TABLET ORAL at 13:57

## 2024-11-12 RX ADMIN — HYDROMORPHONE HYDROCHLORIDE 0.4 MG: 0.2 INJECTION, SOLUTION INTRAMUSCULAR; INTRAVENOUS; SUBCUTANEOUS at 13:49

## 2024-11-12 RX ADMIN — LIDOCAINE HYDROCHLORIDE 50 MG: 20 INJECTION, SOLUTION INFILTRATION; PERINEURAL at 11:06

## 2024-11-12 RX ADMIN — DEXMEDETOMIDINE HYDROCHLORIDE 4 MCG: 200 INJECTION INTRAVENOUS at 11:55

## 2024-11-12 RX ADMIN — FENTANYL CITRATE 50 MCG: 50 INJECTION, SOLUTION INTRAMUSCULAR; INTRAVENOUS at 13:05

## 2024-11-12 RX ADMIN — MIDAZOLAM 2 MG: 1 INJECTION INTRAMUSCULAR; INTRAVENOUS at 10:56

## 2024-11-12 RX ADMIN — DEXMEDETOMIDINE HYDROCHLORIDE 20 MCG: 200 INJECTION INTRAVENOUS at 11:37

## 2024-11-12 RX ADMIN — ONDANSETRON 4 MG: 2 INJECTION INTRAMUSCULAR; INTRAVENOUS at 11:14

## 2024-11-12 RX ADMIN — DEXMEDETOMIDINE HYDROCHLORIDE 4 MCG: 200 INJECTION INTRAVENOUS at 12:05

## 2024-11-12 RX ADMIN — PHENYLEPHRINE HYDROCHLORIDE 0.2 MCG/KG/MIN: 10 INJECTION INTRAVENOUS at 11:15

## 2024-11-12 ASSESSMENT — ACTIVITIES OF DAILY LIVING (ADL)
ADLS_ACUITY_SCORE: 0

## 2024-11-12 NOTE — DISCHARGE INSTRUCTIONS
**Because you had anesthesia today and your history of sleep apnea, it is extremely important that you use your CPAP machine for the next 24 hours while napping or sleeping.**    [facility]  Discharge Instructions: Post-Operative Umbilical/Ventral Hernia Repair    ACTIVITY  Take frequent, short walks and increase your activity gradually.    Avoid strenuous physical activity or heavy lifting greater than 15 lbs. for 4 weeks.  You may climb stairs.  You may drive without restrictions when you are not using any prescription pain medication or muscle relaxant and feel comfortable in a car.  You may return to work/school when you are comfortable without any prescription pain medication.   You may wear an abdominal binder for comfort for 2-3 weeks from your surgery.  You can wash the abdominal binder and dry it on low heat in the dryer.    WOUND CARE  You may remove your outer dressing or Band-Aids and shower 48 hours after the surgery.  Pat your incisions dry and leave them open to air.  Re-apply dressing (Band-Aids or gauze/tape) as needed for comfort or drainage.  You may have steri-strips (looks like white tape) on your incision.  You may peel off the steri-strips 2 weeks after your surgery if they have not peeled off on their own.  If you have skin glue, it will peel up and fall off on its own.  Do not soak your incisions in a tub or pool for 2 weeks.   Do not apply any lotions, creams, or ointments to your incisions.  A ridge under your incisions is normal and will gradually resolve.    DIET  Start with liquids, then gradually resume your regular diet as tolerated.   Drink plenty of fluids to stay hydrated.    PAIN  Expect some tenderness and discomfort at the incision site(s).  Use the prescribed pain medication/muscle relaxant at your discretion.  Expect gradual resolution of your pain over several days.  You may take ibuprofen with food (unless you have been told not to) or  acetaminophen/Tylenol instead of or in addition to your prescribed pain medication.  However, if you are taking Norco or Percocet, do not take any additional acetaminophen/Tylenol.  Do not drink alcohol or drive while you are taking pain medications or muscle relaxants.  You may apply ice to your incisions in 20 minute intervals as needed for the next 48 hours.  After that time, consider switching to heat if you prefer.    EXPECTATIONS  Pain medications can cause constipation.  Limit use when possible.  Take an over the counter or prescribed stool softener/stimulant, such as Colace or Senna, 1-2 times a day with plenty of water.  You may take a mild over the counter laxative, such as Miralax or a suppository, as needed.  You make discontinue these medications once you are having regular bowel movements and/or are no longer taking your narcotic pain medication.  You may have shoulder or upper back discomfort due to the gas used in surgery.  This is temporary and should resolve in 48-72 hours.  Short, frequent walks may help with this.  If you are unable to urinate for 8 hours or feel as though you are not emptying your bladder adequately, we recommend you seek care at an ER or Urgent Care facility for possible catheter placement.     FOLLOW UP  Our office will contact you in approximately 2-3 weeks to check on your progress and answer any questions you may have.  If you are doing well, you will not need to return for a follow up appointment.  If any concerns are identified over the phone, we will help you make an appointment to see a provider.  If you have not received a phone call, have any questions or concerns, or would like to be seen, please call us at [phone] and ask to speak with our nurse.  We are located at [address] [place] [address].    CALL OUR OFFICE -[phone] IF YOU HAVE:   Chills or fever above 101 F.  Increased redness or drainage at your incisions.  Significant  bleeding.  Pain not relieved by your pain medication or rest.  Increasing pain after the first 48 hours.  Any other concerns or questions.             Revised October 2022    Same Day Surgery Discharge Instructions for  Sedation and General Anesthesia     It's not unusual to feel dizzy, light-headed or faint for up to 24 hours after surgery or while taking pain medication.  If you have these symptoms: sit for a few minutes before standing and have someone assist you when you get up to walk or use the bathroom.    You should rest and relax for the next 24 hours. We recommend you make arrangements to have an adult stay with you for at least 24 hours after your discharge.  Avoid hazardous and strenuous activity.    DO NOT DRIVE any vehicle or operate mechanical equipment for 24 hours following the end of your surgery.  Even though you may feel normal, your reactions may be affected by the medication you have received.    Do not drink alcoholic beverages for 24 hours following surgery.     Slowly progress to your regular diet as you feel able. It's not unusual to feel nauseated and/or vomit after receiving anesthesia.  If you develop these symptoms, drink clear liquids (apple juice, ginger ale, broth, 7-up, etc. ) until you feel better.  If your nausea and vomiting persists for 24 hours, please notify your surgeon.      All narcotic pain medications, along with inactivity and anesthesia, can cause constipation. Drinking plenty of liquids and increasing fiber intake will help.    For any questions of a medical nature, call your surgeon.    Do not make important decisions for 24 hours.    If you had general anesthesia, you may have a sore throat for a couple of days related to the breathing tube used during surgery.  You may use Cepacol lozenges to help with this discomfort.  If it worsens or if you develop a fever, contact your surgeon.     If you feel your pain is not well managed with the pain medications prescribed by  your surgeon, please contact your surgeon's office to let them know so they can address your concerns.

## 2024-11-12 NOTE — ANESTHESIA CARE TRANSFER NOTE
Patient: Efraín Estrada    Procedure: Procedure(s):  Robotic assisted laparoscopic ventral hernia repair       Diagnosis: Ventral hernia without obstruction or gangrene [K43.9]  Diagnosis Additional Information: No value filed.    Anesthesia Type:   General     Note:    Oropharynx: oropharynx clear of all foreign objects  Level of Consciousness: awake and drowsy  Oxygen Supplementation: face mask  Level of Supplemental Oxygen (L/min / FiO2): 8  Independent Airway: airway patency satisfactory and stable  Dentition: dentition unchanged  Vital Signs Stable: post-procedure vital signs reviewed and stable  Report to RN Given: handoff report given  Patient transferred to: PACU  Comments: Spontaneously breathing with adequate vT, sats 98 - 100%, TOF 4/4 with sustained tetany. Purposeful movement, following commands with 100% O2 at 15 L/min, suctioned x2, Anesthesiologist notified.  Extubated.  To PACU with O2 via SFM at 10 L/minute, VSS. Monitors on, report to RN.  Handoff Report: Identifed the Patient, Identified the Reponsible Provider, Reviewed the pertinent medical history, Discussed the surgical course, Reviewed Intra-OP anesthesia mangement and issues during anesthesia, Set expectations for post-procedure period and Allowed opportunity for questions and acknowledgement of understanding      Vitals:  Vitals Value Taken Time   BP     Temp     Pulse 87 11/12/24 1255   Resp 23 11/12/24 1255   SpO2 92 % 11/12/24 1255   Vitals shown include unfiled device data.    Electronically Signed By: LUIS Lora CRNA  November 12, 2024  12:56 PM

## 2024-11-12 NOTE — ANESTHESIA PREPROCEDURE EVALUATION
Anesthesia Pre-Procedure Evaluation    Patient: Efraín Estrada   MRN: [id number] : 1965        Procedure : Procedure(s):  Robotic assisted laparoscopic ventral hernia repair          Past Medical History:   Diagnosis Date    HTN (hypertension)     Morbid obesity (H) 2022    NEGIN (obstructive sleep apnea) 2022    Tibia fracture     left       Past Surgical History:   Procedure Laterality Date    COLONOSCOPY  2023    TONSILLECTOMY  2012    inflammed; causing breathing problems with sleeping     ZZHC CLOSED TX TIBIAL SHAFT FX W/O MANIPULATION  2001    plates removed since left       Allergies   Allergen Reactions    Lisinopril Cough    Codeine Rash and Hives      Social History     Tobacco Use    Smoking status: Former     Current packs/day: 0.00     Average packs/day: 0.3 packs/day for 35.7 years (8.9 ttl pk-yrs)     Types: Cigarettes     Start date: 1986     Quit date: 2021     Years since quitting: 3.1    Smokeless tobacco: Never    Tobacco comments:     Never smoked a pack a day. Would often go days without smoking. I was a passive smoker.   Substance Use Topics    Alcohol use: Yes     Alcohol/week: 12.0 standard drinks of alcohol     Types: 12 Cans of beer per week     Comment: Couple of beers daily      Wt Readings from Last 1 Encounters:   10/25/24 146.1 kg (322 lb 3.2 oz)        Anesthesia Evaluation   Pt has had prior anesthetic. Type: General.    No history of anesthetic complications       ROS/MED HX  ENT/Pulmonary:     (+) sleep apnea, uses CPAP,              tobacco use, Past use,                       Neurologic:    (-) no seizures and no CVA   Cardiovascular:     (+)  hypertension- -   -  - -                                      METS/Exercise Tolerance: >4 METS    Hematologic:  - neg hematologic  ROS     Musculoskeletal:  - neg musculoskeletal ROS     GI/Hepatic: Comment: Ventral hernia      Renal/Genitourinary:     (+) renal disease, type: CRI, Pt does  "not require dialysis,           Endo:     (+)               Obesity,    (-) Type II DM   Psychiatric/Substance Use:       Infectious Disease:       Malignancy:       Other:            Physical Exam    Airway        Mallampati: II   TM distance: > 3 FB   Neck ROM: full   Mouth opening: > 3 cm    Respiratory Devices and Support         Dental       (+) Minor Abnormalities - some fillings, tiny chips      Cardiovascular   cardiovascular exam normal       Rhythm and rate: regular     Pulmonary   pulmonary exam normal        breath sounds clear to auscultation           OUTSIDE LABS:  CBC:   Lab Results   Component Value Date    WBC 5.4 10/25/2024    WBC 4.7 05/06/2022    HGB 13.6 10/25/2024    HGB 14.9 05/06/2022    HCT 43.1 10/25/2024    HCT 47.6 05/06/2022     10/25/2024     05/06/2022     BMP:   Lab Results   Component Value Date     10/25/2024     09/04/2024    POTASSIUM 4.6 10/25/2024    POTASSIUM 4.1 09/04/2024    CHLORIDE 102 10/25/2024    CHLORIDE 101 09/04/2024    CO2 27 10/25/2024    CO2 27 09/04/2024    BUN 19.8 10/25/2024    BUN 24.2 (H) 09/04/2024    CR 1.40 (H) 10/25/2024    CR 1.42 (H) 09/04/2024    GLC 97 10/25/2024     (H) 09/04/2024     COAGS: No results found for: \"PTT\", \"INR\", \"FIBR\"  POC: No results found for: \"BGM\", \"HCG\", \"HCGS\"  HEPATIC:   Lab Results   Component Value Date    ALBUMIN 4.6 06/19/2023    PROTTOTAL 7.8 06/19/2023    ALT 21 06/19/2023    AST 20 06/19/2023    ALKPHOS 73 06/19/2023    BILITOTAL 0.2 06/19/2023     OTHER:   Lab Results   Component Value Date    JESSICA 9.9 10/25/2024    LIPASE 41 06/02/2003    TSH 0.90 05/06/2022    T4 0.81 11/13/2015       Anesthesia Plan    ASA Status:  3    NPO Status:  NPO Appropriate    Anesthesia Type: General.     - Airway: ETT   Induction: Propofol.   Maintenance: Balanced.        Consents            Postoperative Care    Pain management: Multi-modal analgesia.   PONV prophylaxis: Ondansetron (or other 5HT-3), " Dexamethasone or Solumedrol, Background Propofol Infusion     Comments:               Minnie Green MD    I have reviewed the pertinent notes and labs in the chart from the past 30 days and (re)examined the patient.  Any updates or changes from those notes are reflected in this note.               # Hypertension: Noted on problem list           # Severe Obesity: Estimated body mass index is 43.7 kg/m  as calculated from the following:    Height as of 10/25/24: 1.829 m (6').    Weight as of 10/25/24: 146.1 kg (322 lb 3.2 oz).

## 2024-11-12 NOTE — OP NOTE
Preoperative diagnosis: Supraumbilical ventral hernia    Postoperative diagnosis: Same    Procedure: Robotic assisted laparoscopic transabdominal preperitoneal supraumbilical ventral hernia repair with mesh    Surgeon: Michael Flor MD    Assistant:Ana Ibrahim PA-C,Physician assistant first assistant was necessary during the performance of this procedure for expertise in patient positioning, prepping, draping, trocar placement, camera management, retraction and exposure, and suctioning.    Anesthesia: GEN endotracheal    Estimated blood loss: 5 cc    Specimens: None    Indication for procedure: This is a 59-year-old gentleman who presented my office with a symptomatic supraumbilical ventral hernia.  We discussed his management options.  It  was elected proceed with robotic assisted laparoscopic repair.  The potential risks of bleeding, infection, prosthetic infection, chronic pain, recurrence of hernia, or visceral injury were all reviewed in great detail.  The patient's questions were answered and he consented to proceed.    Procedure: After informed consent was obtained the patient was taken to the operative room and placed supine on the operative room table.  Following the induction of adequate general endotracheal esthesia, the abdomen was shaved, prepped and draped in usual manner.  A surgeon initiated timeout was completed.  IV antibiotics have been administered.  A stab incision was then made in the left upper quadrant through which a 5 mm optical trocar was used to gain access to the abdominal cavity.  A carbon dioxide pneumoperitoneum was subsequently is established.  After adequate insufflation under direct vision to 8 mm robotic ports were placed along the left lateral abdomen.  The initial 5 mm port was exchanged for an 8 mm robotic port.  The robot was then uneventfully docked and I assumed control of the surgeon console.  There was still some residual omentum contained within the ventral hernia.  This  was manually reduced.  Starting left lateral significantly away from the defect which was in the supraumbilical position the peritoneum was incised.  A preperitoneal dissection was then initiated widely around the area of the defect.  The hernia sac was reduced.  The defect measured 3 cm in diameter.  This was sutured closed primarily with #1 STRATAFIX suture.  A 10 x 15 cm piece of ProGrip mesh was then placed within the preperitoneal space centered underneath the primary closure of the defect.  With the mesh in good position the peritoneum was sutured closed with 2.0 STRATAFIX suture.  The robot was then undocked.  Carbon dioxide was allowed to escape from the abdomen.  Trocars were removed.  Local anesthetic was injected.  Skin incisions were closed with subicular 4-0 Monocryl suture.  Steri-Strips and Band-Aids were applied.  The patient tolerated this well.  He was awakened in the operating room, extubated and taken to the recovery room in a stable condition.    Michael Flor MD

## 2024-11-12 NOTE — ANESTHESIA POSTPROCEDURE EVALUATION
Patient: Efraín Estrada    Procedure: Procedure(s):  Robotic assisted laparoscopic ventral hernia repair       Anesthesia Type:  General    Note:  Disposition: Outpatient   Postop Pain Control: Uneventful            Sign Out: Well controlled pain   PONV: No   Neuro/Psych: Uneventful            Sign Out: Acceptable/Baseline neuro status   Airway/Respiratory: Uneventful            Sign Out: Acceptable/Baseline resp. status   CV/Hemodynamics: Uneventful            Sign Out: Acceptable CV status; No obvious hypovolemia; No obvious fluid overload   Other NRE: NONE   DID A NON-ROUTINE EVENT OCCUR? No           Last vitals:  Vitals Value Taken Time   /85 11/12/24 1345   Temp 36.8  C (98.2  F) 11/12/24 1330   Pulse 77 11/12/24 1357   Resp 35 11/12/24 1357   SpO2 91 % 11/12/24 1358   Vitals shown include unfiled device data.    Electronically Signed By: Minnie Green MD  November 12, 2024  3:04 PM

## 2024-11-12 NOTE — OR NURSING
Up to BR voids light yellow X1- AOX3-VSS-O2 sats >92% RA- Good PO intake, adequate pin control 5/10- Pt and responsible adult verbalize understanding of discharge instructions, denies questions. Up in W/C - transported to door for discharge to home.

## 2024-11-12 NOTE — ANESTHESIA PROCEDURE NOTES
Airway       Patient location during procedure: OR       Procedure Start/Stop Times: 11/12/2024 11:12 AM  Staff -        CRNA: Nicola Harman APRN CRNA       Performed By: CRNA  Consent for Airway        Urgency: elective  Indications and Patient Condition       Indications for airway management: mando-procedural       Induction type:intravenous       Mask difficulty assessment: 2 - vent by mask + OA or adjuvant +/- NMBA    Final Airway Details       Final airway type: endotracheal airway       Successful airway: ETT - single and Oral  Endotracheal Airway Details        ETT size (mm): 7.5       Cuffed: yes       Successful intubation technique: video laryngoscopy       VL Blade Size: Kemp 2       Grade View of Cords: 1       Adjucts: stylet       Position: Right       Measured from: gums/teeth       Secured at (cm): 24       Bite block used: Oral Airway    Post intubation assessment        Placement verified by: capnometry, equal breath sounds and chest rise        Number of attempts at approach: 1       Number of other approaches attempted: 0       Secured with: tape       Ease of procedure: easy       Dentition: Intact and Unchanged    Medication(s) Administered   Medication Administration Time: 11/12/2024 11:12 AM    Additional Comments       .  .  OR table with HOB elevated for induction sequence.  Required a 2 handed mask technique with a 10 cm OAW.  Elective glidescope due to large neck, large tongue, and short TMD.  Grade 1 view of cords via VL easily and quickly obtained.  .  .

## 2024-11-12 NOTE — BRIEF OP NOTE
[facility]  General Surgery Brief Operative Note    Pre-operative diagnosis: Ventral hernia without obstruction or gangrene [K43.9]   Post-operative diagnosis Same   Procedure: Procedure(s):  Robotic assisted laparoscopic ventral hernia repair    Surgeon(s), Assistant(s): Surgeons and Role:     * Michael Flor MD - Primary     * Ana Ibrahim PA-C - Assisting   Estimated blood loss: Minimal <10mL   Drains: None   Specimens: * No specimens in log *   Findings: See op note.    Complications:  Condition: None  Stable   Comments:      Ana Ibrahim PA-C  [facility]         See dictated operative report for full details

## 2024-11-15 ENCOUNTER — TELEPHONE (OUTPATIENT)
Dept: SURGERY | Facility: CLINIC | Age: 59
End: 2024-11-15
Payer: COMMERCIAL

## 2024-11-15 NOTE — TELEPHONE ENCOUNTER
Procedure: Robotic assisted laparoscopic ventral hernia repair    Date: 11/12/2024    Surgeon: Chacho    Patient wondering if he can remove tegaderm dressing over umbilical area.  Informed him that yes, he can remove. Steri strips will likely be underneath, covering incision. Those should remain in place for two weeks, unless they fall off on their own before then    No need to cover incisions with bandaids, unless oozing    All questions answered    He knows to call PRN    Shanthi Laughlin RN-BSN

## 2024-11-15 NOTE — TELEPHONE ENCOUNTER
Patient had a robot assisted lap ventral hernia repair, is wondering what to do about bandages and coverings, wants to take a shower today    Please call    Phone: [phone]   Message ok

## 2025-02-02 ENCOUNTER — HEALTH MAINTENANCE LETTER (OUTPATIENT)
Age: 60
End: 2025-02-02

## 2025-02-12 ENCOUNTER — ANESTHESIA EVENT (OUTPATIENT)
Dept: SURGERY | Facility: CLINIC | Age: 60
End: 2025-02-12
Payer: COMMERCIAL

## 2025-02-12 ENCOUNTER — APPOINTMENT (OUTPATIENT)
Dept: CT IMAGING | Facility: CLINIC | Age: 60
End: 2025-02-12
Attending: EMERGENCY MEDICINE
Payer: COMMERCIAL

## 2025-02-12 ENCOUNTER — HOSPITAL ENCOUNTER (OUTPATIENT)
Facility: CLINIC | Age: 60
Setting detail: OBSERVATION
Discharge: HOME | End: 2025-02-13
Attending: EMERGENCY MEDICINE | Admitting: INTERNAL MEDICINE
Payer: COMMERCIAL

## 2025-02-12 ENCOUNTER — APPOINTMENT (OUTPATIENT)
Dept: GENERAL RADIOLOGY | Facility: CLINIC | Age: 60
End: 2025-02-12
Attending: STUDENT IN AN ORGANIZED HEALTH CARE EDUCATION/TRAINING PROGRAM
Payer: COMMERCIAL

## 2025-02-12 ENCOUNTER — ANESTHESIA (OUTPATIENT)
Dept: SURGERY | Facility: CLINIC | Age: 60
End: 2025-02-12
Payer: COMMERCIAL

## 2025-02-12 DIAGNOSIS — I10 HYPERTENSION, UNSPECIFIED TYPE: ICD-10-CM

## 2025-02-12 DIAGNOSIS — N17.9 ACUTE KIDNEY INJURY: ICD-10-CM

## 2025-02-12 DIAGNOSIS — N13.2 HYDRONEPHROSIS WITH URINARY OBSTRUCTION DUE TO URETERAL CALCULUS: ICD-10-CM

## 2025-02-12 DIAGNOSIS — N20.1 OBSTRUCTION OF LEFT URETEROPELVIC JUNCTION (UPJ) DUE TO STONE: Primary | ICD-10-CM

## 2025-02-12 DIAGNOSIS — N20.0 BILATERAL NEPHROLITHIASIS: ICD-10-CM

## 2025-02-12 LAB
ALBUMIN UR-MCNC: 30 MG/DL
ANION GAP SERPL CALCULATED.3IONS-SCNC: 14 MMOL/L (ref 7–15)
APPEARANCE UR: CLEAR
BASOPHILS # BLD AUTO: 0 10E3/UL (ref 0–0.2)
BASOPHILS NFR BLD AUTO: 0 %
BILIRUB UR QL STRIP: NEGATIVE
BUN SERPL-MCNC: 21.1 MG/DL (ref 8–23)
CALCIUM SERPL-MCNC: 9.5 MG/DL (ref 8.8–10.4)
CHLORIDE SERPL-SCNC: 95 MMOL/L (ref 98–107)
COLOR UR AUTO: ABNORMAL
CREAT SERPL-MCNC: 2.1 MG/DL (ref 0.67–1.17)
EGFRCR SERPLBLD CKD-EPI 2021: 36 ML/MIN/1.73M2
EOSINOPHIL # BLD AUTO: 0 10E3/UL (ref 0–0.7)
EOSINOPHIL NFR BLD AUTO: 0 %
ERYTHROCYTE [DISTWIDTH] IN BLOOD BY AUTOMATED COUNT: 14 % (ref 10–15)
GLUCOSE SERPL-MCNC: 106 MG/DL (ref 70–99)
GLUCOSE UR STRIP-MCNC: NEGATIVE MG/DL
HCO3 SERPL-SCNC: 27 MMOL/L (ref 22–29)
HCT VFR BLD AUTO: 43.5 % (ref 40–53)
HGB BLD-MCNC: 13.7 G/DL (ref 13.3–17.7)
HGB UR QL STRIP: ABNORMAL
HOLD SPECIMEN: NORMAL
HOLD SPECIMEN: NORMAL
IMM GRANULOCYTES # BLD: 0.1 10E3/UL
IMM GRANULOCYTES NFR BLD: 1 %
KETONES UR STRIP-MCNC: NEGATIVE MG/DL
LEUKOCYTE ESTERASE UR QL STRIP: NEGATIVE
LIPASE SERPL-CCNC: 25 U/L (ref 13–60)
LYMPHOCYTES # BLD AUTO: 1.2 10E3/UL (ref 0.8–5.3)
LYMPHOCYTES NFR BLD AUTO: 16 %
MCH RBC QN AUTO: 26.3 PG (ref 26.5–33)
MCHC RBC AUTO-ENTMCNC: 31.5 G/DL (ref 31.5–36.5)
MCV RBC AUTO: 84 FL (ref 78–100)
MONOCYTES # BLD AUTO: 0.7 10E3/UL (ref 0–1.3)
MONOCYTES NFR BLD AUTO: 10 %
NEUTROPHILS # BLD AUTO: 5.6 10E3/UL (ref 1.6–8.3)
NEUTROPHILS NFR BLD AUTO: 73 %
NITRATE UR QL: NEGATIVE
NRBC # BLD AUTO: 0 10E3/UL
NRBC BLD AUTO-RTO: 0 /100
PH UR STRIP: 6.5 [PH] (ref 5–7)
PLATELET # BLD AUTO: 243 10E3/UL (ref 150–450)
POTASSIUM SERPL-SCNC: 3.9 MMOL/L (ref 3.4–5.3)
RBC # BLD AUTO: 5.2 10E6/UL (ref 4.4–5.9)
RBC URINE: 28 /HPF
SODIUM SERPL-SCNC: 136 MMOL/L (ref 135–145)
SP GR UR STRIP: 1.01 (ref 1–1.03)
UROBILINOGEN UR STRIP-MCNC: NORMAL MG/DL
WBC # BLD AUTO: 7.7 10E3/UL (ref 4–11)
WBC URINE: 1 /HPF

## 2025-02-12 PROCEDURE — 370N000017 HC ANESTHESIA TECHNICAL FEE, PER MIN: Performed by: STUDENT IN AN ORGANIZED HEALTH CARE EDUCATION/TRAINING PROGRAM

## 2025-02-12 PROCEDURE — 250N000009 HC RX 250: Performed by: STUDENT IN AN ORGANIZED HEALTH CARE EDUCATION/TRAINING PROGRAM

## 2025-02-12 PROCEDURE — 81001 URINALYSIS AUTO W/SCOPE: CPT | Performed by: EMERGENCY MEDICINE

## 2025-02-12 PROCEDURE — 36415 COLL VENOUS BLD VENIPUNCTURE: CPT | Performed by: EMERGENCY MEDICINE

## 2025-02-12 PROCEDURE — 250N000025 HC SEVOFLURANE, PER MIN: Performed by: STUDENT IN AN ORGANIZED HEALTH CARE EDUCATION/TRAINING PROGRAM

## 2025-02-12 PROCEDURE — 255N000002 HC RX 255 OP 636: Performed by: STUDENT IN AN ORGANIZED HEALTH CARE EDUCATION/TRAINING PROGRAM

## 2025-02-12 PROCEDURE — G0378 HOSPITAL OBSERVATION PER HR: HCPCS

## 2025-02-12 PROCEDURE — 999N000179 XR SURGERY CARM FLUORO LESS THAN 5 MIN W STILLS

## 2025-02-12 PROCEDURE — 258N000003 HC RX IP 258 OP 636: Performed by: INTERNAL MEDICINE

## 2025-02-12 PROCEDURE — 258N000003 HC RX IP 258 OP 636: Performed by: EMERGENCY MEDICINE

## 2025-02-12 PROCEDURE — 710N000009 HC RECOVERY PHASE 1, LEVEL 1, PER MIN: Performed by: STUDENT IN AN ORGANIZED HEALTH CARE EDUCATION/TRAINING PROGRAM

## 2025-02-12 PROCEDURE — 250N000009 HC RX 250: Performed by: NURSE ANESTHETIST, CERTIFIED REGISTERED

## 2025-02-12 PROCEDURE — C1758 CATHETER, URETERAL: HCPCS | Performed by: STUDENT IN AN ORGANIZED HEALTH CARE EDUCATION/TRAINING PROGRAM

## 2025-02-12 PROCEDURE — 99204 OFFICE O/P NEW MOD 45 MIN: CPT | Mod: 25 | Performed by: STUDENT IN AN ORGANIZED HEALTH CARE EDUCATION/TRAINING PROGRAM

## 2025-02-12 PROCEDURE — 272N000001 HC OR GENERAL SUPPLY STERILE: Performed by: STUDENT IN AN ORGANIZED HEALTH CARE EDUCATION/TRAINING PROGRAM

## 2025-02-12 PROCEDURE — 250N000013 HC RX MED GY IP 250 OP 250 PS 637: Performed by: EMERGENCY MEDICINE

## 2025-02-12 PROCEDURE — 83690 ASSAY OF LIPASE: CPT | Performed by: EMERGENCY MEDICINE

## 2025-02-12 PROCEDURE — 258N000003 HC RX IP 258 OP 636: Performed by: NURSE ANESTHETIST, CERTIFIED REGISTERED

## 2025-02-12 PROCEDURE — 80048 BASIC METABOLIC PNL TOTAL CA: CPT | Performed by: EMERGENCY MEDICINE

## 2025-02-12 PROCEDURE — C1769 GUIDE WIRE: HCPCS | Performed by: STUDENT IN AN ORGANIZED HEALTH CARE EDUCATION/TRAINING PROGRAM

## 2025-02-12 PROCEDURE — C2617 STENT, NON-COR, TEM W/O DEL: HCPCS | Performed by: STUDENT IN AN ORGANIZED HEALTH CARE EDUCATION/TRAINING PROGRAM

## 2025-02-12 PROCEDURE — 85025 COMPLETE CBC W/AUTO DIFF WBC: CPT | Performed by: EMERGENCY MEDICINE

## 2025-02-12 PROCEDURE — 52332 CYSTOSCOPY AND TREATMENT: CPT | Mod: LT | Performed by: STUDENT IN AN ORGANIZED HEALTH CARE EDUCATION/TRAINING PROGRAM

## 2025-02-12 PROCEDURE — 360N000083 HC SURGERY LEVEL 3 W/ FLUORO, PER MIN: Performed by: STUDENT IN AN ORGANIZED HEALTH CARE EDUCATION/TRAINING PROGRAM

## 2025-02-12 PROCEDURE — 250N000011 HC RX IP 250 OP 636: Performed by: NURSE ANESTHETIST, CERTIFIED REGISTERED

## 2025-02-12 PROCEDURE — 99285 EMERGENCY DEPT VISIT HI MDM: CPT | Mod: 25

## 2025-02-12 PROCEDURE — 999N000141 HC STATISTIC PRE-PROCEDURE NURSING ASSESSMENT: Performed by: STUDENT IN AN ORGANIZED HEALTH CARE EDUCATION/TRAINING PROGRAM

## 2025-02-12 PROCEDURE — 74176 CT ABD & PELVIS W/O CONTRAST: CPT

## 2025-02-12 PROCEDURE — 258N000001 HC RX 258: Performed by: STUDENT IN AN ORGANIZED HEALTH CARE EDUCATION/TRAINING PROGRAM

## 2025-02-12 PROCEDURE — 74420 UROGRAPHY RTRGR +-KUB: CPT | Mod: 26 | Performed by: STUDENT IN AN ORGANIZED HEALTH CARE EDUCATION/TRAINING PROGRAM

## 2025-02-12 PROCEDURE — 99223 1ST HOSP IP/OBS HIGH 75: CPT | Performed by: INTERNAL MEDICINE

## 2025-02-12 PROCEDURE — 258N000003 HC RX IP 258 OP 636: Performed by: ANESTHESIOLOGY

## 2025-02-12 DEVICE — IMPLANTABLE DEVICE: Type: IMPLANTABLE DEVICE | Site: BLADDER | Status: FUNCTIONAL

## 2025-02-12 RX ORDER — HYDROMORPHONE HYDROCHLORIDE 2 MG/1
2 TABLET ORAL EVERY 4 HOURS PRN
Status: DISCONTINUED | OUTPATIENT
Start: 2025-02-12 | End: 2025-02-13 | Stop reason: HOSPADM

## 2025-02-12 RX ORDER — ACETAMINOPHEN 325 MG/1
975 TABLET ORAL ONCE
Status: DISCONTINUED | OUTPATIENT
Start: 2025-02-12 | End: 2025-02-12 | Stop reason: HOSPADM

## 2025-02-12 RX ORDER — NALOXONE HYDROCHLORIDE 0.4 MG/ML
0.4 INJECTION, SOLUTION INTRAMUSCULAR; INTRAVENOUS; SUBCUTANEOUS
Status: DISCONTINUED | OUTPATIENT
Start: 2025-02-12 | End: 2025-02-13 | Stop reason: HOSPADM

## 2025-02-12 RX ORDER — CEFAZOLIN SODIUM/WATER 3 G/30 ML
3 SYRINGE (ML) INTRAVENOUS
Status: DISCONTINUED | OUTPATIENT
Start: 2025-02-12 | End: 2025-02-12 | Stop reason: HOSPADM

## 2025-02-12 RX ORDER — AMLODIPINE BESYLATE 10 MG/1
10 TABLET ORAL DAILY
Status: DISCONTINUED | OUTPATIENT
Start: 2025-02-13 | End: 2025-02-13 | Stop reason: HOSPADM

## 2025-02-12 RX ORDER — HYDROMORPHONE HYDROCHLORIDE 1 MG/ML
0.5 INJECTION, SOLUTION INTRAMUSCULAR; INTRAVENOUS; SUBCUTANEOUS EVERY 4 HOURS PRN
Status: DISCONTINUED | OUTPATIENT
Start: 2025-02-12 | End: 2025-02-13 | Stop reason: HOSPADM

## 2025-02-12 RX ORDER — ALBUTEROL SULFATE 0.83 MG/ML
2.5 SOLUTION RESPIRATORY (INHALATION) EVERY 4 HOURS PRN
Status: DISCONTINUED | OUTPATIENT
Start: 2025-02-12 | End: 2025-02-12 | Stop reason: HOSPADM

## 2025-02-12 RX ORDER — NALOXONE HYDROCHLORIDE 0.4 MG/ML
0.1 INJECTION, SOLUTION INTRAMUSCULAR; INTRAVENOUS; SUBCUTANEOUS
Status: DISCONTINUED | OUTPATIENT
Start: 2025-02-12 | End: 2025-02-12 | Stop reason: HOSPADM

## 2025-02-12 RX ORDER — DEXAMETHASONE SODIUM PHOSPHATE 4 MG/ML
INJECTION, SOLUTION INTRA-ARTICULAR; INTRALESIONAL; INTRAMUSCULAR; INTRAVENOUS; SOFT TISSUE PRN
Status: DISCONTINUED | OUTPATIENT
Start: 2025-02-12 | End: 2025-02-12

## 2025-02-12 RX ORDER — ONDANSETRON 4 MG/1
4 TABLET, ORALLY DISINTEGRATING ORAL EVERY 30 MIN PRN
Status: DISCONTINUED | OUTPATIENT
Start: 2025-02-12 | End: 2025-02-12 | Stop reason: HOSPADM

## 2025-02-12 RX ORDER — SODIUM CHLORIDE, SODIUM LACTATE, POTASSIUM CHLORIDE, CALCIUM CHLORIDE 600; 310; 30; 20 MG/100ML; MG/100ML; MG/100ML; MG/100ML
INJECTION, SOLUTION INTRAVENOUS CONTINUOUS
Status: ACTIVE | OUTPATIENT
Start: 2025-02-12 | End: 2025-02-13

## 2025-02-12 RX ORDER — PROPOFOL 10 MG/ML
INJECTION, EMULSION INTRAVENOUS PRN
Status: DISCONTINUED | OUTPATIENT
Start: 2025-02-12 | End: 2025-02-12

## 2025-02-12 RX ORDER — ONDANSETRON 2 MG/ML
4 INJECTION INTRAMUSCULAR; INTRAVENOUS EVERY 30 MIN PRN
Status: DISCONTINUED | OUTPATIENT
Start: 2025-02-12 | End: 2025-02-12 | Stop reason: HOSPADM

## 2025-02-12 RX ORDER — AMOXICILLIN 250 MG
1 CAPSULE ORAL 2 TIMES DAILY PRN
Status: DISCONTINUED | OUTPATIENT
Start: 2025-02-12 | End: 2025-02-13 | Stop reason: HOSPADM

## 2025-02-12 RX ORDER — CEFAZOLIN SODIUM/WATER 3 G/30 ML
3 SYRINGE (ML) INTRAVENOUS SEE ADMIN INSTRUCTIONS
Status: DISCONTINUED | OUTPATIENT
Start: 2025-02-12 | End: 2025-02-12 | Stop reason: HOSPADM

## 2025-02-12 RX ORDER — FENTANYL CITRATE 50 UG/ML
INJECTION, SOLUTION INTRAMUSCULAR; INTRAVENOUS PRN
Status: DISCONTINUED | OUTPATIENT
Start: 2025-02-12 | End: 2025-02-12

## 2025-02-12 RX ORDER — ACETAMINOPHEN 325 MG/1
650 TABLET ORAL EVERY 4 HOURS PRN
Status: DISCONTINUED | OUTPATIENT
Start: 2025-02-12 | End: 2025-02-13 | Stop reason: HOSPADM

## 2025-02-12 RX ORDER — ACETAMINOPHEN 500 MG
1000 TABLET ORAL ONCE
Status: COMPLETED | OUTPATIENT
Start: 2025-02-12 | End: 2025-02-12

## 2025-02-12 RX ORDER — CEFAZOLIN SODIUM 1 G/3ML
INJECTION, POWDER, FOR SOLUTION INTRAMUSCULAR; INTRAVENOUS PRN
Status: DISCONTINUED | OUTPATIENT
Start: 2025-02-12 | End: 2025-02-12

## 2025-02-12 RX ORDER — SODIUM CHLORIDE, SODIUM LACTATE, POTASSIUM CHLORIDE, CALCIUM CHLORIDE 600; 310; 30; 20 MG/100ML; MG/100ML; MG/100ML; MG/100ML
INJECTION, SOLUTION INTRAVENOUS CONTINUOUS PRN
Status: DISCONTINUED | OUTPATIENT
Start: 2025-02-12 | End: 2025-02-12

## 2025-02-12 RX ORDER — FENTANYL CITRATE 50 UG/ML
50 INJECTION, SOLUTION INTRAMUSCULAR; INTRAVENOUS EVERY 5 MIN PRN
Status: DISCONTINUED | OUTPATIENT
Start: 2025-02-12 | End: 2025-02-12 | Stop reason: HOSPADM

## 2025-02-12 RX ORDER — LABETALOL HYDROCHLORIDE 5 MG/ML
10 INJECTION, SOLUTION INTRAVENOUS
Status: DISCONTINUED | OUTPATIENT
Start: 2025-02-12 | End: 2025-02-12 | Stop reason: HOSPADM

## 2025-02-12 RX ORDER — NALOXONE HYDROCHLORIDE 0.4 MG/ML
0.2 INJECTION, SOLUTION INTRAMUSCULAR; INTRAVENOUS; SUBCUTANEOUS
Status: DISCONTINUED | OUTPATIENT
Start: 2025-02-12 | End: 2025-02-13 | Stop reason: HOSPADM

## 2025-02-12 RX ORDER — PROCHLORPERAZINE MALEATE 5 MG/1
10 TABLET ORAL EVERY 6 HOURS PRN
Status: DISCONTINUED | OUTPATIENT
Start: 2025-02-12 | End: 2025-02-13 | Stop reason: HOSPADM

## 2025-02-12 RX ORDER — AMOXICILLIN 250 MG
2 CAPSULE ORAL 2 TIMES DAILY PRN
Status: DISCONTINUED | OUTPATIENT
Start: 2025-02-12 | End: 2025-02-13 | Stop reason: HOSPADM

## 2025-02-12 RX ORDER — ONDANSETRON 2 MG/ML
INJECTION INTRAMUSCULAR; INTRAVENOUS PRN
Status: DISCONTINUED | OUTPATIENT
Start: 2025-02-12 | End: 2025-02-12

## 2025-02-12 RX ORDER — POLYETHYLENE GLYCOL 3350 17 G
2 POWDER IN PACKET (EA) ORAL
Status: DISCONTINUED | OUTPATIENT
Start: 2025-02-12 | End: 2025-02-13 | Stop reason: HOSPADM

## 2025-02-12 RX ORDER — FENTANYL CITRATE 50 UG/ML
25 INJECTION, SOLUTION INTRAMUSCULAR; INTRAVENOUS EVERY 5 MIN PRN
Status: DISCONTINUED | OUTPATIENT
Start: 2025-02-12 | End: 2025-02-12 | Stop reason: HOSPADM

## 2025-02-12 RX ORDER — ONDANSETRON 2 MG/ML
4 INJECTION INTRAMUSCULAR; INTRAVENOUS EVERY 6 HOURS PRN
Status: DISCONTINUED | OUTPATIENT
Start: 2025-02-12 | End: 2025-02-13 | Stop reason: HOSPADM

## 2025-02-12 RX ORDER — HYDRALAZINE HYDROCHLORIDE 20 MG/ML
2.5-5 INJECTION INTRAMUSCULAR; INTRAVENOUS EVERY 10 MIN PRN
Status: DISCONTINUED | OUTPATIENT
Start: 2025-02-12 | End: 2025-02-12 | Stop reason: HOSPADM

## 2025-02-12 RX ORDER — IBUPROFEN 600 MG/1
600 TABLET, FILM COATED ORAL ONCE
Status: COMPLETED | OUTPATIENT
Start: 2025-02-12 | End: 2025-02-12

## 2025-02-12 RX ORDER — LIDOCAINE HYDROCHLORIDE 20 MG/ML
INJECTION, SOLUTION INFILTRATION; PERINEURAL PRN
Status: DISCONTINUED | OUTPATIENT
Start: 2025-02-12 | End: 2025-02-12

## 2025-02-12 RX ORDER — DEXAMETHASONE SODIUM PHOSPHATE 4 MG/ML
4 INJECTION, SOLUTION INTRA-ARTICULAR; INTRALESIONAL; INTRAMUSCULAR; INTRAVENOUS; SOFT TISSUE
Status: DISCONTINUED | OUTPATIENT
Start: 2025-02-12 | End: 2025-02-12 | Stop reason: HOSPADM

## 2025-02-12 RX ORDER — KETOROLAC TROMETHAMINE 30 MG/ML
INJECTION, SOLUTION INTRAMUSCULAR; INTRAVENOUS PRN
Status: DISCONTINUED | OUTPATIENT
Start: 2025-02-12 | End: 2025-02-12

## 2025-02-12 RX ORDER — ACETAMINOPHEN 650 MG/1
650 SUPPOSITORY RECTAL EVERY 4 HOURS PRN
Status: DISCONTINUED | OUTPATIENT
Start: 2025-02-12 | End: 2025-02-13 | Stop reason: HOSPADM

## 2025-02-12 RX ORDER — ACETAMINOPHEN 650 MG/1
650 SUPPOSITORY RECTAL ONCE
Status: DISCONTINUED | OUTPATIENT
Start: 2025-02-12 | End: 2025-02-12 | Stop reason: HOSPADM

## 2025-02-12 RX ORDER — SODIUM CHLORIDE, SODIUM LACTATE, POTASSIUM CHLORIDE, CALCIUM CHLORIDE 600; 310; 30; 20 MG/100ML; MG/100ML; MG/100ML; MG/100ML
INJECTION, SOLUTION INTRAVENOUS CONTINUOUS
Status: DISCONTINUED | OUTPATIENT
Start: 2025-02-12 | End: 2025-02-12 | Stop reason: HOSPADM

## 2025-02-12 RX ORDER — ONDANSETRON 4 MG/1
4 TABLET, ORALLY DISINTEGRATING ORAL EVERY 6 HOURS PRN
Status: DISCONTINUED | OUTPATIENT
Start: 2025-02-12 | End: 2025-02-13 | Stop reason: HOSPADM

## 2025-02-12 RX ADMIN — LIDOCAINE HYDROCHLORIDE 50 MG: 20 INJECTION, SOLUTION INFILTRATION; PERINEURAL at 21:10

## 2025-02-12 RX ADMIN — PROPOFOL 200 MG: 10 INJECTION, EMULSION INTRAVENOUS at 21:10

## 2025-02-12 RX ADMIN — KETOROLAC TROMETHAMINE 30 MG: 30 INJECTION, SOLUTION INTRAMUSCULAR at 21:27

## 2025-02-12 RX ADMIN — SODIUM CHLORIDE, POTASSIUM CHLORIDE, SODIUM LACTATE AND CALCIUM CHLORIDE: 600; 310; 30; 20 INJECTION, SOLUTION INTRAVENOUS at 20:10

## 2025-02-12 RX ADMIN — SODIUM CHLORIDE 1000 ML: 9 INJECTION, SOLUTION INTRAVENOUS at 18:13

## 2025-02-12 RX ADMIN — DEXAMETHASONE SODIUM PHOSPHATE 8 MG: 4 INJECTION, SOLUTION INTRA-ARTICULAR; INTRALESIONAL; INTRAMUSCULAR; INTRAVENOUS; SOFT TISSUE at 21:10

## 2025-02-12 RX ADMIN — SODIUM CHLORIDE, POTASSIUM CHLORIDE, SODIUM LACTATE AND CALCIUM CHLORIDE: 600; 310; 30; 20 INJECTION, SOLUTION INTRAVENOUS at 21:04

## 2025-02-12 RX ADMIN — FENTANYL CITRATE 100 MCG: 50 INJECTION INTRAMUSCULAR; INTRAVENOUS at 21:10

## 2025-02-12 RX ADMIN — ONDANSETRON 4 MG: 2 INJECTION INTRAMUSCULAR; INTRAVENOUS at 21:27

## 2025-02-12 RX ADMIN — CEFAZOLIN 2 G: 1 INJECTION, POWDER, FOR SOLUTION INTRAMUSCULAR; INTRAVENOUS at 21:04

## 2025-02-12 RX ADMIN — SODIUM CHLORIDE, POTASSIUM CHLORIDE, SODIUM LACTATE AND CALCIUM CHLORIDE: 600; 310; 30; 20 INJECTION, SOLUTION INTRAVENOUS at 22:01

## 2025-02-12 RX ADMIN — MIDAZOLAM 2 MG: 1 INJECTION INTRAMUSCULAR; INTRAVENOUS at 21:04

## 2025-02-12 RX ADMIN — ACETAMINOPHEN 1000 MG: 500 TABLET, FILM COATED ORAL at 16:08

## 2025-02-12 RX ADMIN — IBUPROFEN 600 MG: 600 TABLET, FILM COATED ORAL at 16:08

## 2025-02-12 ASSESSMENT — ACTIVITIES OF DAILY LIVING (ADL)
ADLS_ACUITY_SCORE: 41
ADLS_ACUITY_SCORE: 44
ADLS_ACUITY_SCORE: 41
ADLS_ACUITY_SCORE: 38
ADLS_ACUITY_SCORE: 41
ADLS_ACUITY_SCORE: 41
ADLS_ACUITY_SCORE: 42
ADLS_ACUITY_SCORE: 41

## 2025-02-12 ASSESSMENT — COLUMBIA-SUICIDE SEVERITY RATING SCALE - C-SSRS
6. HAVE YOU EVER DONE ANYTHING, STARTED TO DO ANYTHING, OR PREPARED TO DO ANYTHING TO END YOUR LIFE?: NO
2. HAVE YOU ACTUALLY HAD ANY THOUGHTS OF KILLING YOURSELF IN THE PAST MONTH?: NO
1. IN THE PAST MONTH, HAVE YOU WISHED YOU WERE DEAD OR WISHED YOU COULD GO TO SLEEP AND NOT WAKE UP?: NO
1. IN THE PAST MONTH, HAVE YOU WISHED YOU WERE DEAD OR WISHED YOU COULD GO TO SLEEP AND NOT WAKE UP?: NO
2. HAVE YOU ACTUALLY HAD ANY THOUGHTS OF KILLING YOURSELF IN THE PAST MONTH?: NO

## 2025-02-12 NOTE — LETTER
[facility] OBSERVATION DEPT  [address]  [facility] [address]  Phone: [phone]    February 13, 2025        Efraín Estrada  [address] [place] [id number]          To whom it may concern:    RE: Efraín Estrada    Patient was seen and treated today at our hospital 2/12/25 - 2/13/25 due to illness. Please excuse him from work.    Please contact me for questions or concerns.      Sincerely,        Shauna Ronquillo PA-C

## 2025-02-12 NOTE — ED TRIAGE NOTES
L flank pain began 2200 yesterday. Pain wraps around laterally to LUQ and LLQ. Denies stool changes ex small amounts yesterday due to pain. Afebrile. Denies urinary changes or hx kidney stone. Denies daily alcohol use. ABC in tact. A/OX4     Triage Assessment (Adult)       Row Name 02/12/25 1443          Triage Assessment    Airway WDL WDL        Respiratory WDL    Respiratory WDL WDL        Skin Circulation/Temperature WDL    Skin Circulation/Temperature WDL WDL        Cardiac WDL    Cardiac WDL WDL        Peripheral/Neurovascular WDL    Peripheral Neurovascular WDL WDL        Cognitive/Neuro/Behavioral WDL    Cognitive/Neuro/Behavioral WDL WDL

## 2025-02-12 NOTE — ED PROVIDER NOTES
"  Emergency Department Note      History of Present Illness     Chief Complaint   Flank Pain      HPI   Efraín Estrada is a 59 year old male with history of hypertension who presents to the ED for evaluation of left sided flank pain. Efraín reports sudden onset last night and the pain has been constant since. It slightly wraps around his left lower abdomen. The pain prevented him from sleeping last night. Pain worsens with a deep breath. He endorses some slight nausea last night without vomiting and has since resolved. He noticed a few episodes of brown \"tea colored\" urine 10-12 days ago and also notes it has been more difficult to pass stool with decreased output in the mornings. He denies hard stools, diarrhea, fever, chills, chest pain, shortness of breath, or rash. No history of similar symptoms. No history of kidney stones, or issues with kidneys, aorta, or bleeding. No recent medication changes.    The patient notes he last ate at 8pm last night and has been drinking water throughout the day but that's it.     Independent Historian   None    Review of External Notes   None    Past Medical History     Medical History and Problem List   Hypertension  NEGIN  Ventral hernia  CKD stage 3a  Colon polyp  Hyperlipidemia  Benign neoplasm of descending, transverse colon, and cecum    Medications   Amlodipine  Cyclobenzaprine  Losartan-hydrochlorothiazide     Surgical History   Ventral herniorrhaphy  Tonsillectomy  Treatment of tibial shaft plates removed    Physical Exam     Patient Vitals for the past 24 hrs:   BP Temp Temp src Pulse Resp SpO2 Height Weight   02/12/25 1638 (!) 153/97 -- -- 87 -- 99 % -- --   02/12/25 1609 -- -- -- -- -- 98 % -- --   02/12/25 1608 (!) 141/94 -- -- 88 -- -- -- --   02/12/25 1445 (!) 151/92 98  F (36.7  C) Temporal 92 20 100 % 1.829 m (6') (!) 149.7 kg (330 lb 0.5 oz)     Physical Exam  General: Adult sitting upright  Eyes: PERRL, Conjunctive within normal limits.  No scleral " icterus.  ENT: Moist mucous membranes, oropharynx clear.   CV: Normal S1S2, no murmur, rub or gallop. Regular rate and rhythm  Resp: Clear to auscultation bilaterally, no wheezes, rales or rhonchi. Normal respiratory effort.  GI: Abdomen is soft and nondistended.  Mild tenderness in the left mid abdomen.  Firm supra umbilical hernia palpable, nontender.  No palpable masses otherwise. No rebound or guarding.  No CVA tenderness to percussion.  MSK: No edema. Normal active range of motion.  No back tenderness to palpation.  Skin: Warm and dry. No rashes or lesions or ecchymoses on visible skin.  Neuro: Alert and oriented. Responds appropriately to all questions and commands. No focal findings appreciated. Normal muscle tone.  Psych: Normal mood and affect. Pleasant.     Diagnostics     Lab Results   Labs Ordered and Resulted from Time of ED Arrival to Time of ED Departure   ROUTINE UA WITH MICROSCOPIC REFLEX TO CULTURE - Abnormal       Result Value    Color Urine Straw      Appearance Urine Clear      Glucose Urine Negative      Bilirubin Urine Negative      Ketones Urine Negative      Specific Gravity Urine 1.012      Blood Urine Small (*)     pH Urine 6.5      Protein Albumin Urine 30 (*)     Urobilinogen Urine Normal      Nitrite Urine Negative      Leukocyte Esterase Urine Negative      RBC Urine 28 (*)     WBC Urine 1     BASIC METABOLIC PANEL - Abnormal    Sodium 136      Potassium 3.9      Chloride 95 (*)     Carbon Dioxide (CO2) 27      Anion Gap 14      Urea Nitrogen 21.1      Creatinine 2.10 (*)     GFR Estimate 36 (*)     Calcium 9.5      Glucose 106 (*)    CBC WITH PLATELETS AND DIFFERENTIAL - Abnormal    WBC Count 7.7      RBC Count 5.20      Hemoglobin 13.7      Hematocrit 43.5      MCV 84      MCH 26.3 (*)     MCHC 31.5      RDW 14.0      Platelet Count 243      % Neutrophils 73      % Lymphocytes 16      % Monocytes 10      % Eosinophils 0      % Basophils 0      % Immature Granulocytes 1      NRBCs per  100 WBC 0      Absolute Neutrophils 5.6      Absolute Lymphocytes 1.2      Absolute Monocytes 0.7      Absolute Eosinophils 0.0      Absolute Basophils 0.0      Absolute Immature Granulocytes 0.1      Absolute NRBCs 0.0     LIPASE - Normal    Lipase 25         Imaging   CT Abdomen Pelvis w/o Contrast   Final Result   IMPRESSION:    1.  Mild left hydronephrosis secondary to a 7 mm calculus at the left ureteropelvic junction. Additional nonobstructing bilateral renal calculi.   2.  Fluid collection in the midline ventral abdominal wall soft tissues immediately superior to the umbilicus. Patient has a history of repair of a supraumbilical abdominal wall hernia and this may represent a postoperative seroma.   3.  Mild diffuse bladder wall thickening which is favored secondary to chronic outlet obstruction in the setting of prostatomegaly but can be correlated with urinalysis.   4.  Hepatic steatosis.          Independent Interpretation   None    ED Course      Medications Administered   Medications   sodium chloride 0.9% BOLUS 1,000 mL (1,000 mLs Intravenous $New Bag 2/12/25 1813)   ibuprofen (ADVIL/MOTRIN) tablet 600 mg (600 mg Oral $Given 2/12/25 1608)   acetaminophen (TYLENOL) tablet 1,000 mg (1,000 mg Oral $Given 2/12/25 1608)       Discussion of Management   Admitting Hospitalist, Dr. Hurtado  Urology, Dr. Vernon  who recommends admission for definitive care and observation over night.     ED Course   ED Course as of 02/12/25 1902 Wed Feb 12, 2025   1555 I obtained history and examined the patient as noted above.    1745 I spoke with Dr. Vernon of urology regarding the patient's history and presentation today. He recommends admission for stent placement. I rechecked the patient and explained findings. He states he last ate last night at 2000 and only drank water today.   1845 I spoke with Dr. Hurtado of the hospitalist team, who accepted the patient for admission.    I reassessed the patient and  discussed the plan. He reports he is comfortable with the plan.     Additional Documentation  None    Medical Decision Making / Diagnosis     CMS Diagnoses: None    MIPS   None    MDM   Efraín Estrada is a 59 year old male who presented with unilateral flank and abdominal pain consistent with renal colic. CT confirms a ureteral stone.  The patient was initially in discomfort but preferred oral medications. He was initially given tylenol and ibuprofen with moderate pain.   Further NSAIDs will be held at this time given findings of acute kidney injury, possibly related to the obstructive findings on CT related to the UVJ stone.  There is no fever or evidence of a urinary tract infection.I  considered other etiologies for these symptoms including AAA and pyelonephritis but these are unlikely given the otherwise normal CT scan and urinalysis.  Given the KENJI in the setting of large proximal stone, I consulted with urology who recommended admission for definitive care, likely stenting, with observation overnight and trending of creatinine.  Peripheral IV was placed he was given IV normal saline, as needed pain medications.  Patient feels comfortable with this plan for admission. All questions were answered prior to admission.     Disposition   The patient was admitted to the hospital.     Diagnosis     ICD-10-CM    1. Obstruction of left ureteropelvic junction (UPJ) due to stone  N20.1 Case Request: CYSTOSCOPY, WITH LEFT RETROGRADE PYELOGRAM AND LEFT URETERAL STENT INSERTION, POSSIBLE RIGHT RETROGRADE PYELOGRAM AND RIGHT URETERAL STENT INSERTION     Case Request: CYSTOSCOPY, WITH LEFT RETROGRADE PYELOGRAM AND LEFT URETERAL STENT INSERTION, POSSIBLE RIGHT RETROGRADE PYELOGRAM AND RIGHT URETERAL STENT INSERTION      2. Bilateral nephrolithiasis  N20.0 Case Request: CYSTOSCOPY, WITH LEFT RETROGRADE PYELOGRAM AND LEFT URETERAL STENT INSERTION, POSSIBLE RIGHT RETROGRADE PYELOGRAM AND RIGHT URETERAL STENT INSERTION      Case Request: CYSTOSCOPY, WITH LEFT RETROGRADE PYELOGRAM AND LEFT URETERAL STENT INSERTION, POSSIBLE RIGHT RETROGRADE PYELOGRAM AND RIGHT URETERAL STENT INSERTION      3. Hydronephrosis with urinary obstruction due to ureteral calculus  N13.2       4. Acute kidney injury  N17.9       5. Hypertension, unspecified type  I10                  Scribe Disclosure:  I, Jennyfer Sanchez, am serving as a scribe at 4:31 PM on 2/12/2025 to document services personally performed by Mirna Fernandez MD based on my observations and the provider's statements to me.        Mirna Fernandez MD  02/12/25 1920       Mirna Fernandez MD  02/12/25 1923

## 2025-02-13 ENCOUNTER — PREP FOR PROCEDURE (OUTPATIENT)
Dept: UROLOGY | Facility: CLINIC | Age: 60
End: 2025-02-13
Payer: COMMERCIAL

## 2025-02-13 VITALS
HEIGHT: 72 IN | RESPIRATION RATE: 17 BRPM | BODY MASS INDEX: 42.66 KG/M2 | SYSTOLIC BLOOD PRESSURE: 121 MMHG | OXYGEN SATURATION: 97 % | TEMPERATURE: 97.9 F | HEART RATE: 80 BPM | WEIGHT: 315 LBS | DIASTOLIC BLOOD PRESSURE: 79 MMHG

## 2025-02-13 DIAGNOSIS — N20.1 LEFT URETERAL STONE: Primary | ICD-10-CM

## 2025-02-13 LAB
ANION GAP SERPL CALCULATED.3IONS-SCNC: 10 MMOL/L (ref 7–15)
BUN SERPL-MCNC: 22 MG/DL (ref 8–23)
CALCIUM SERPL-MCNC: 8.9 MG/DL (ref 8.8–10.4)
CHLORIDE SERPL-SCNC: 99 MMOL/L (ref 98–107)
CREAT SERPL-MCNC: 1.83 MG/DL (ref 0.67–1.17)
EGFRCR SERPLBLD CKD-EPI 2021: 42 ML/MIN/1.73M2
ERYTHROCYTE [DISTWIDTH] IN BLOOD BY AUTOMATED COUNT: 14.3 % (ref 10–15)
GLUCOSE SERPL-MCNC: 140 MG/DL (ref 70–99)
HCO3 SERPL-SCNC: 26 MMOL/L (ref 22–29)
HCT VFR BLD AUTO: 38.8 % (ref 40–53)
HGB BLD-MCNC: 12.8 G/DL (ref 13.3–17.7)
MCH RBC QN AUTO: 27.4 PG (ref 26.5–33)
MCHC RBC AUTO-ENTMCNC: 33 G/DL (ref 31.5–36.5)
MCV RBC AUTO: 83 FL (ref 78–100)
PLATELET # BLD AUTO: 235 10E3/UL (ref 150–450)
POTASSIUM SERPL-SCNC: 4.6 MMOL/L (ref 3.4–5.3)
RBC # BLD AUTO: 4.67 10E6/UL (ref 4.4–5.9)
SODIUM SERPL-SCNC: 135 MMOL/L (ref 135–145)
WBC # BLD AUTO: 5.9 10E3/UL (ref 4–11)

## 2025-02-13 PROCEDURE — 250N000013 HC RX MED GY IP 250 OP 250 PS 637: Performed by: INTERNAL MEDICINE

## 2025-02-13 PROCEDURE — 99238 HOSP IP/OBS DSCHRG MGMT 30/<: CPT | Performed by: PHYSICIAN ASSISTANT

## 2025-02-13 PROCEDURE — 85014 HEMATOCRIT: CPT | Performed by: INTERNAL MEDICINE

## 2025-02-13 PROCEDURE — 36415 COLL VENOUS BLD VENIPUNCTURE: CPT | Performed by: INTERNAL MEDICINE

## 2025-02-13 PROCEDURE — G0378 HOSPITAL OBSERVATION PER HR: HCPCS

## 2025-02-13 PROCEDURE — 99212 OFFICE O/P EST SF 10 MIN: CPT | Performed by: PHYSICIAN ASSISTANT

## 2025-02-13 PROCEDURE — 80048 BASIC METABOLIC PNL TOTAL CA: CPT | Performed by: INTERNAL MEDICINE

## 2025-02-13 RX ORDER — ACETAMINOPHEN 325 MG/1
975 TABLET ORAL ONCE
OUTPATIENT
Start: 2025-02-13 | End: 2025-02-13

## 2025-02-13 RX ADMIN — AMLODIPINE BESYLATE 10 MG: 10 TABLET ORAL at 08:10

## 2025-02-13 ASSESSMENT — ACTIVITIES OF DAILY LIVING (ADL)
ADLS_ACUITY_SCORE: 38

## 2025-02-13 NOTE — PLAN OF CARE
PRIMARY DIAGNOSIS: Cystoscopy- L ureteral stent placement.  OUTPATIENT/OBSERVATION GOALS TO BE MET BEFORE DISCHARGE:  1. Pain Status: Pain free.     2. Return to near baseline physical activity: Yes     3. Cleared for discharge by consultants (if involved): No        Discharge Planner Nurse  Safe discharge environment identified: Yes  Barriers to discharge: Yes          BP (!) 162/99   Pulse 67   Temp 97.8  F (36.6  C) (Oral)   Resp 18   Ht 1.829 m (6')   Wt (!) 147.9 kg (326 lb 1.6 oz)   SpO2 98%   BMI 44.23 kg/m     Patient is Alert and Oriented x4. They are SBA with no assistive devices .  Pt is a Regular diet.  They are denying pain.  Patient has Lactated Ringer's running at 100 mL per hour. Jasso in place till 2/13- pink oupt noted. Urology following and reassess pt 2/13. 3 L NC oxygen overnight for sleep. Pt lives ind.        Please review provider order for any additional goals.   Nurse to notify provider when observation goals have been met and patient is ready for discharge.  Goal Outcome Evaluation:       Plan of Care Reviewed With: patient     Overall Patient Progress: improvingOverall Patient Progress: improving     Outcome Evaluation: A&Ox4, SBA. Jasso cath in place till 2/13. Urology following- post op L ureteral stent placement. LR 100mL/hr running. 3 L NC oxygen. lives ind

## 2025-02-13 NOTE — PLAN OF CARE
ROOM # 208-2    Living Situation Ind  Facility name:  : [facility]    Activity level at baseline: Ind  Activity level on admit: SBA    Who will be transporting you at discharge: TBD    Patient registered to observation; given Patient Bill of Rights; given the opportunity to ask questions about observation status and their plan of care.  Patient has been oriented to the observation room, bathroom and call light is in place.    Discussed discharge goals and expectations with patient/family.

## 2025-02-13 NOTE — PLAN OF CARE
Patient's After Visit Summary was reviewed with patient.  Patient verbalized understanding of After Visit Summary, recommended follow up and was given an opportunity to ask questions.   Discharge medications sent home with patient/family: No, Not applicable   Discharged with other: self

## 2025-02-13 NOTE — ANESTHESIA POSTPROCEDURE EVALUATION
Patient: Efraín Estrada    Procedure: Procedure(s):  CYSTOSCOPY, WITH LEFT RETROGRADE PYELOGRAM AND LEFT URETERAL STENT INSERTION, arreola placement       Anesthesia Type:  General    Note:  Disposition: Inpatient   Postop Pain Control: Uneventful            Sign Out: Well controlled pain   PONV: No   Neuro/Psych: Uneventful            Sign Out: Acceptable/Baseline neuro status   Airway/Respiratory: Uneventful            Sign Out: Acceptable/Baseline resp. status   CV/Hemodynamics: Uneventful            Sign Out: Acceptable CV status; No obvious hypovolemia; No obvious fluid overload   Other NRE:    DID A NON-ROUTINE EVENT OCCUR? No           Last vitals:  Vitals Value Taken Time   /88 02/12/25 2210   Temp 96.8  F (36  C) 02/12/25 2212   Pulse 67 02/12/25 2210   Resp 11 02/12/25 2210   SpO2 96 % 02/12/25 2210   Vitals shown include unfiled device data.    Electronically Signed By: Bita Mittal MD  February 12, 2025  10:44 PM

## 2025-02-13 NOTE — PROGRESS NOTES
[facility]rology Progress Note          Assessment and Plan:     Assessment:    POD 1 cystoscopy, left retrograde pyelogram, and left ureteral stent insertion.  Complex Jasso catheter over a wire    Obstruction of left ureteropelvic junction (UPJ) due to stone    Bilateral nephrolithiasis    Urethral stricture    Acute kidney injury    Hydronephrosis with urinary obstruction due to ureteral calculus    Hypertension, unspecified type      Plan:   -Continue with indwelling ureteral stent.  -Okay to discontinue Jasso catheter.  Ideally, patient would urinate prior to discharge.  -Will arrange for outpatient definitive stone management to include cystoscopy, left-sided ureteroscopy with laser lithotripsy and basketing, and left ureteral stent exchange.  Our office will help to coordinate.  -Will likely consider medication management to try to dissolve the right stones, given the likelihood that they are uric acid.  -KENJI is slowly improving.  -Okay to discharge from urology perspective.  Will plan on signing off.  Please contact us with any additional urological concerns.    Ijeoma Worthy PA-C   [facility]  [phone]               Interval History:     Doing okay. Only complaint is Jasso catheter.  Jasso catheter in place draining yellow-julisa urine.  Overall.  No tachycardia.  Creatinine slowly improving 1.83 GFR 42 (2.10 EGFR 36).  Baseline creatinine 1.4.  Denies nausea, vomiting fevers, chills.  WBC 5.9 (7.7).              Review of Systems:     The 5 point Review of Systems is negative other than noted in the HPI             Medications:     Current Facility-Administered Medications   Medication Dose Route Frequency Provider Last Rate Last Admin    acetaminophen (TYLENOL) tablet 650 mg  650 mg Oral Q4H PRN Greg Hurtado MD        Or    acetaminophen (TYLENOL) Suppository 650 mg  650 mg Rectal Q4H PRN Greg Hurtado MD        amLODIPine (NORVASC) tablet 10  mg  10 mg Oral Daily Greg Hurtado MD   10 mg at 02/13/25 0810    HYDROmorphone (DILAUDID) tablet 2 mg  2 mg Oral Q4H PRN Greg Hurtado MD        HYDROmorphone (PF) (DILAUDID) injection 0.5 mg  0.5 mg Intravenous Q4H PRN Greg Hurtado MD        melatonin tablet 5 mg  5 mg Oral At Bedtime PRN Greg Hurtado MD        naloxone (NARCAN) injection 0.2 mg  0.2 mg Intravenous Q2 Min PRN Greg Hurtado MD        Or    naloxone (NARCAN) injection 0.4 mg  0.4 mg Intravenous Q2 Min PRGreg Chacon MD        Or    naloxone (NARCAN) injection 0.2 mg  0.2 mg Intramuscular Q2 Min PRN Greg Hurtado MD        Or    naloxone (NARCAN) injection 0.4 mg  0.4 mg Intramuscular Q2 Min PRN Greg Hurtado MD        nicotine (COMMIT) lozenge 2 mg  2 mg Buccal Q1H PRN Greg Hurtado MD        ondansetron (ZOFRAN ODT) ODT tab 4 mg  4 mg Oral Q6H PRN Greg Hurtado MD        Or    ondansetron (ZOFRAN) injection 4 mg  4 mg Intravenous Q6H PRGreg Chacon MD        prochlorperazine (COMPAZINE) injection 10 mg  10 mg Intravenous Q6H PRGreg Chacon MD        Or    prochlorperazine (COMPAZINE) tablet 10 mg  10 mg Oral Q6H PRGreg Chacon MD        senna-docusate (SENOKOT-S/PERICOLACE) 8.6-50 MG per tablet 1 tablet  1 tablet Oral BID PRGreg Chacon MD        Or    senna-docusate (SENOKOT-S/PERICOLACE) 8.6-50 MG per tablet 2 tablet  2 tablet Oral BID PRGreg Chacon MD                      Physical Exam:   Vitals were reviewed  Patient Vitals for the past 8 hrs:   BP Temp Temp src Pulse Resp SpO2   02/13/25 0813 -- -- -- -- -- 97 %   02/13/25 0753 121/79 97.9  F (36.6  C) Oral 80 17 100 %   02/13/25 0433 (!) 162/99 97.8  F (36.6  C) Oral 67 18 98 %     GEN: NAD, sitting up in bed  EYES: EOMI  MOUTH: MMM  NECK:  "Supple  RESP: Unlabored breathing, NC 3 LPM  SKIN: Warm  NEURO: AAO  URO: Jasso catheter in place draining yellow-julisa urine           Data:   No results found for: \"NTBNPI\", \"NTBNP\"  Lab Results   Component Value Date    WBC 5.9 02/13/2025    WBC 7.7 02/12/2025    WBC 5.4 10/25/2024    HGB 12.8 (L) 02/13/2025    HGB 13.7 02/12/2025    HGB 13.6 10/25/2024    HCT 38.8 (L) 02/13/2025    HCT 43.5 02/12/2025    HCT 43.1 10/25/2024    MCV 83 02/13/2025    MCV 84 02/12/2025    MCV 86 10/25/2024     02/13/2025     02/12/2025     10/25/2024     "

## 2025-02-13 NOTE — ANESTHESIA PREPROCEDURE EVALUATION
Anesthesia Pre-Procedure Evaluation    Patient: Efraín Estrada   MRN: [id number] : 1965        Procedure : Procedure(s):  CYSTOSCOPY, WITH LEFT RETROGRADE PYELOGRAM AND LEFT URETERAL STENT INSERTION, POSSIBLE RIGHT RETROGRADE PYELOGRAM AND RIGHT URETERAL STENT INSERTION          Past Medical History:   Diagnosis Date    HTN (hypertension)     Morbid obesity (H) 2022    NEGIN (obstructive sleep apnea) 2022    Tibia fracture     left       Past Surgical History:   Procedure Laterality Date    COLONOSCOPY  2023    DAVINCI XI HERNIORRHAPHY VENTRAL N/A 2024    Procedure: Robotic assisted laparoscopic ventral hernia repair;  Surgeon: Michael Flor MD;  Location: [facility] OR    TONSILLECTOMY  2012    inflammed; causing breathing problems with sleeping     ZZHC CLOSED TX TIBIAL SHAFT FX W/O MANIPULATION  2001    plates removed since left       Allergies   Allergen Reactions    Lisinopril Cough    Codeine Rash and Hives      Social History     Tobacco Use    Smoking status: Former     Current packs/day: 0.00     Average packs/day: 0.3 packs/day for 35.7 years (8.9 ttl pk-yrs)     Types: Cigarettes     Start date: 1986     Quit date: 2021     Years since quitting: 3.4    Smokeless tobacco: Never    Tobacco comments:     Never smoked a pack a day. Would often go days without smoking. I was a passive smoker.   Substance Use Topics    Alcohol use: Yes     Alcohol/week: 12.0 standard drinks of alcohol     Types: 12 Cans of beer per week     Comment: Couple of beers daily      Wt Readings from Last 1 Encounters:   25 (!) 149.7 kg (330 lb 0.5 oz)        Anesthesia Evaluation            ROS/MED HX  ENT/Pulmonary:     (+) sleep apnea,                                       Neurologic:       Cardiovascular:     (+)  hypertension- -   -  - -                                      METS/Exercise Tolerance:     Hematologic:       Musculoskeletal:       GI/Hepatic:      "  Renal/Genitourinary:     (+) renal disease (KENJI on CKD),      Nephrolithiasis  (obstructing stone, not septic),       Endo:     (+)               Obesity (BMI 44),       Psychiatric/Substance Use:       Infectious Disease:       Malignancy:       Other:            Physical Exam    Airway        Mallampati: II   TM distance: > 3 FB   Neck ROM: full   Mouth opening: > 3 cm    Respiratory Devices and Support         Dental           Cardiovascular   cardiovascular exam normal          Pulmonary   pulmonary exam normal            Other findings: Previous anesthesia record 11/2024 reviewed- easy intubation via VL    OUTSIDE LABS:  CBC:   Lab Results   Component Value Date    WBC 7.7 02/12/2025    WBC 5.4 10/25/2024    HGB 13.7 02/12/2025    HGB 13.6 10/25/2024    HCT 43.5 02/12/2025    HCT 43.1 10/25/2024     02/12/2025     10/25/2024     BMP:   Lab Results   Component Value Date     02/12/2025     10/25/2024    POTASSIUM 3.9 02/12/2025    POTASSIUM 4.6 10/25/2024    CHLORIDE 95 (L) 02/12/2025    CHLORIDE 102 10/25/2024    CO2 27 02/12/2025    CO2 27 10/25/2024    BUN 21.1 02/12/2025    BUN 19.8 10/25/2024    CR 2.10 (H) 02/12/2025    CR 1.40 (H) 10/25/2024     (H) 02/12/2025    GLC 97 10/25/2024     COAGS: No results found for: \"PTT\", \"INR\", \"FIBR\"  POC: No results found for: \"BGM\", \"HCG\", \"HCGS\"  HEPATIC:   Lab Results   Component Value Date    ALBUMIN 4.6 06/19/2023    PROTTOTAL 7.8 06/19/2023    ALT 21 06/19/2023    AST 20 06/19/2023    ALKPHOS 73 06/19/2023    BILITOTAL 0.2 06/19/2023     OTHER:   Lab Results   Component Value Date    JESSICA 9.5 02/12/2025    LIPASE 25 02/12/2025    TSH 0.90 05/06/2022    T4 0.81 11/13/2015       Anesthesia Plan    ASA Status:  3, emergent    NPO Status:  NPO Appropriate    Anesthesia Type: General.     - Airway: LMA   Induction: Intravenous.   Maintenance: Balanced.        Consents    Anesthesia Plan(s) and associated risks, benefits, and realistic " alternatives discussed. Questions answered and patient/representative(s) expressed understanding.     - Discussed:     - Discussed with:  Patient            Postoperative Care    Pain management: IV analgesics, Oral pain medications, Multi-modal analgesia.   PONV prophylaxis: Ondansetron (or other 5HT-3), Dexamethasone or Solumedrol     Comments:               Bita Mittal MD    I have reviewed the pertinent notes and labs in the chart from the past 30 days and (re)examined the patient.  Any updates or changes from those notes are reflected in this note.    Clinically Significant Risk Factors Present on Admission          # Hypochloremia: Lowest Cl = 95 mmol/L in last 2 days, will monitor as appropriate          # Hypertension: Noted on problem list           # Severe Obesity: Estimated body mass index is 44.76 kg/m  as calculated from the following:    Height as of this encounter: 1.829 m (6').    Weight as of this encounter: 149.7 kg (330 lb 0.5 oz).

## 2025-02-13 NOTE — H&P
[facility]       Hospitalist History & Physical     Assessment & Plan     ASSESSMENT    59M with history of morbid obesity BMI 44, hypertension, NEGIN, and CKD stage III presents with abdominal pain and found to have obstructive left ureteral calculi with hydronephrosis likely going for ureteral stent placement tonight.    PLAN    Obstructive Left Ureteral Calculi w/ Hydronephrosis  -Presents with left flank pain radiating into groin  -CT with evidence of obstructive left ureteral calculi with hydronephrosis   -Urology consulted in ED, likely going for ureteral stent placement tonight  -LR@100ml/hr  -Pain regimen in place    KENJI on CKD Stage III  -Suspect post-obstructive due to renal stone and possibly pre-renal from poor oral intake  -LR@100ml/hr and urology consult per above  -Trend closely    Postoperative Seroma  -Had hernia repair in November at [facility]  -CT this admission with evidence of seroma  -No abdominal pain or clinical evidence that this could be infected  -Recommend close follow-up with general surgery at [facility]    Other Issues  -Morbid obesity BMI 44: Complicates all aspects of care  -Essential hypertension: Home amlodipine.  Hold losartan-HCTZ given KENJI  -NAFLD: Noted on CT.  Recommend weight loss and close follow-up    DVT Prophy  -SCDs    Disposition  -Medically Ready for Discharge: Anticipated Tomorrow       Mendoza Mcghee MD    History of Present Illness     Efraín Estrada is a 59 year old with history of morbid obesity BMI 44, hypertension, NEGIN, and CKD stage III presents with abdominal pain.  Patient was in his normal state of health until last night when he started experiencing severe left flank pain that radiates into his groin.  Denies urinary symptoms or hematuria.  Denies fevers or chills.  No nausea, vomiting, diarrhea, or constipation issues.  No history of kidney stones. In the ED, VSS.  UA with small amount of blood.  Creatinine 2.1 (baseline 1.4). CT  abd/pelvis with evidence of mild left hydronephrosis secondary to a 7 mm calculus at the left ureteropelvic junction. Additional nonobstructing bilateral renal calculi.  Case discussed with urology and patient likely to go for ureteral stenting tonight. Admitted to medicine    Review of Systems     A Comprehensive greater than 10 system review of systems was carried out.  Pertinent positives and negatives are noted above.  Otherwise negative for contributory information.     Past Medical History     Past Medical History:   Diagnosis Date    HTN (hypertension)     Morbid obesity (H) 4/24/2022    NEGIN (obstructive sleep apnea) 4/24/2022    Tibia fracture 2001    left      Medications     Current Outpatient Medications   Medication Sig Dispense Refill    amLODIPine (NORVASC) 10 MG tablet Take 1 tablet (10 mg) by mouth daily. 90 tablet 3    cyclobenzaprine (FLEXERIL) 10 MG tablet Take 1 tablet (10 mg) by mouth 2 times daily. 15 tablet 0    HYDROcodone-acetaminophen (NORCO) 5-325 MG tablet Take 1 tablet by mouth every 4 hours as needed for moderate pain or severe pain. 12 tablet 0    losartan-hydrochlorothiazide (HYZAAR) 100-25 MG tablet Take 1 tablet by mouth daily. 90 tablet 3    senna-docusate (SENOKOT-S/PERICOLACE) 8.6-50 MG tablet Take 1 tablet by mouth 2 times daily. 15 tablet 0      Past Surgical History     Past Surgical History:   Procedure Laterality Date    COLONOSCOPY  07/2023    DAVINCI XI HERNIORRHAPHY VENTRAL N/A 11/12/2024    Procedure: Robotic assisted laparoscopic ventral hernia repair;  Surgeon: Michael Flor MD;  Location: [facility] OR    TONSILLECTOMY  11/01/2012    inflammed; causing breathing problems with sleeping     ZZHC CLOSED TX TIBIAL SHAFT FX W/O MANIPULATION  01/01/2001    plates removed since left      Family History     Family History   Problem Relation Age of Onset    Cancer Mother         ovarian cancer    Cancer Father         stomach and spinal     Unknown/Adopted Son         car  accident     Mental Illness Sister     Asthma Brother     Hypertension No family hx of      Allergies     Allergies   Allergen Reactions    Lisinopril Cough    Codeine Rash and Hives     Social History     Social History     Tobacco Use    Smoking status: Former     Current packs/day: 0.00     Average packs/day: 0.3 packs/day for 35.7 years (8.9 ttl pk-yrs)     Types: Cigarettes     Start date: 1/9/1986     Quit date: 9/19/2021     Years since quitting: 3.4    Smokeless tobacco: Never    Tobacco comments:     Never smoked a pack a day. Would often go days without smoking. I was a passive smoker.   Substance Use Topics    Alcohol use: Yes     Alcohol/week: 12.0 standard drinks of alcohol     Types: 12 Cans of beer per week     Comment: Couple of beers daily     Physical Exam   Blood pressure (!) 153/97, pulse 87, temperature 98  F (36.7  C), temperature source Temporal, resp. rate 20, height 1.829 m (6'), weight (!) 149.7 kg (330 lb 0.5 oz), SpO2 99%.    General: Ill appearing, cooperative with exam, in NAD.  HEENT: Atraumatic. No erythema in posterior pharynx.  Lymph: No cervical or inguinal lymphadenopathy.  Cardiac: RRR. No murmurs.  Lungs: CTAB. Nl WOB.  Abd: Mild left flank tenderness.. No rebound or gaurding. Nl bowel sounds.  Ext: No edema. 2+ pulses.  Skin: No rashes, abrasions, or contusions.  Psych: A&Ox3. Nl affect.  Neuro: 5/5 strength. Sensation intact.    Labs & Imaging     Reviewed and Pertinent results discussed in assessment and plan.

## 2025-02-13 NOTE — CONSULTS
[facility] Urology Consultation    Efraín Estrada MRN# [id number]   Age: 59 year old YOB: 1965     Date of Admission:  2/12/2025    Reason for consult: Left ureteropelvic junction stone, bilateral nephrolithiasis       Requesting physician: Rebecca       Level of consult: One-time consult to assist in determining a diagnosis and to recommend an appropriate treatment plan           Assessment and Plan:   Assessment:   60 yo M with Body mass index is 44.76 kg/m ., HTN, NEGIN, CKD presenting with left abdominal pain and found to have left ureteropelvic junction stone as well as nonobstructive nephrolithiasis bilaterally    I discussed with the patient the need for cystoscopy with left ureteral stent placement to relieve the obstruction on the left side followed by ureteroscopic management to definitively treat the stones after passive dilation of the ureter for 1 to 2 weeks.  I also discussed that he has a large right-sided renal stone.  Based on the Hounsfield units of around 500 likely the stone is uric acid.  I offered prestenting on the right with plan for either ureteroscopic management or shockwave on the right however if this is uric acid there is a chance that the stone is able to be managed with dissolution therapy alone with potassium citrate alkalinization of the urine.  He opts not to pursue any surgical treatment of the right-sided stone at this time and proceed with left-sided stent placement only.  Risks of stent placement including hematuria, stent pain irritation, infection were discussed.  Informed consent was obtained.          Plan:   To OR: completed, left stent placed  Will need interval ureteroscopic management of the left sided stones in the next few weeks  Presumably the stones will be uric acid, and we will discuss potential dissolution therapy when he returns in clinic    Jaime Vernon MD   [facility] Urology  [phone] clinic phone               Chief Complaint:    Left abdominal pain     History is obtained from the patient         History of Present Illness:   This patient is a 59 year old male who presents with left abdominal pain.  He has no personal family of stones.  He did have a brother that had either gallstones or kidney stones he is not sure which one.  He has had left abdominal pain since yesterday which worsened overnight and was unable to allow him to sleep.  He denies any fevers or chills or nausea or vomiting.  He presented to the ER where a CT scan showed a large left ureteropelvic junction stone as well as bilateral nephrolithiasis.    He notes that he eats a lot of protein          Past Medical History:   I have reviewed this patient's past medical history  Past Medical History:   Diagnosis Date    HTN (hypertension)     Morbid obesity (H) 4/24/2022    NEGIN (obstructive sleep apnea) 4/24/2022    Tibia fracture 2001    left              Past Surgical History:   I have reviewed this patient's past surgical history  Past Surgical History:   Procedure Laterality Date    COLONOSCOPY  07/2023    DAVINCI XI HERNIORRHAPHY VENTRAL N/A 11/12/2024    Procedure: Robotic assisted laparoscopic ventral hernia repair;  Surgeon: Michael Flor MD;  Location: [facility] OR    TONSILLECTOMY  11/01/2012    inflammed; causing breathing problems with sleeping     ZZHC CLOSED TX TIBIAL SHAFT FX W/O MANIPULATION  01/01/2001    plates removed since left              Social History:   I have reviewed this patient's social history  Social History     Tobacco Use    Smoking status: Former     Current packs/day: 0.00     Average packs/day: 0.3 packs/day for 35.7 years (8.9 ttl pk-yrs)     Types: Cigarettes     Start date: 1/9/1986     Quit date: 9/19/2021     Years since quitting: 3.4    Smokeless tobacco: Never    Tobacco comments:     Never smoked a pack a day. Would often go days without smoking. I was a passive smoker.   Substance Use Topics    Alcohol use: Yes     Alcohol/week:  12.0 standard drinks of alcohol     Types: 12 Cans of beer per week     Comment: Couple of beers daily             Family History:   I have reviewed this patient's family history  Family History   Problem Relation Age of Onset    Cancer Mother         ovarian cancer    Cancer Father         stomach and spinal     Unknown/Adopted Son         car accident     Mental Illness Sister     Asthma Brother     Hypertension No family hx of      Family history not discussed          Immunizations:     Immunization History   Administered Date(s) Administered    COVID-19 12+ (Pfizer) 10/25/2024    COVID-19 Bivalent 18+ (Moderna) 06/19/2023    COVID-19 Monovalent 18+ (Moderna) 06/08/2021, 07/06/2021    COVID-19 Monovalent Booster 18+ (Moderna) 04/22/2022    TDAP (Adacel,Boostrix) 08/24/2023             Allergies:     Allergies   Allergen Reactions    Lisinopril Cough    Codeine Rash and Hives             Medications:     Current Facility-Administered Medications   Medication Dose Route Frequency Provider Last Rate Last Admin    [Auto Hold] acetaminophen (TYLENOL) tablet 650 mg  650 mg Oral Q4H PRN Greg Hurtado MD        Or    [Auto Hold] acetaminophen (TYLENOL) Suppository 650 mg  650 mg Rectal Q4H PRN Greg Hurtado MD        [Auto Hold] HYDROmorphone (DILAUDID) tablet 2 mg  2 mg Oral Q4H PRN Greg Hurtado MD        [Auto Hold] HYDROmorphone (PF) (DILAUDID) injection 0.5 mg  0.5 mg Intravenous Q4H PRN Greg Hurtado MD        lactated ringers infusion   Intravenous Continuous Greg Hurtado  mL/hr at 02/12/25 2010 New Bag at 02/12/25 2010    [Auto Hold] melatonin tablet 5 mg  5 mg Oral At Bedtime PRN Greg Hurtado MD        [Auto Hold] nicotine (COMMIT) lozenge 2 mg  2 mg Buccal Q1H PRN Greg Hurtado MD        [Auto Hold] ondansetron (ZOFRAN ODT) ODT tab 4 mg  4 mg Oral Q6H PRN Greg Hurtado MD        Or     [Auto Hold] ondansetron (ZOFRAN) injection 4 mg  4 mg Intravenous Q6H PRN Greg Hurtado MD        [Auto Hold] prochlorperazine (COMPAZINE) injection 10 mg  10 mg Intravenous Q6H PRN Greg Hurtado MD        Or    [Auto Hold] prochlorperazine (COMPAZINE) tablet 10 mg  10 mg Oral Q6H PRN Greg Hurtado MD        [Auto Hold] senna-docusate (SENOKOT-S/PERICOLACE) 8.6-50 MG per tablet 1 tablet  1 tablet Oral BID PRN Greg Hurtado MD        Or    [Auto Hold] senna-docusate (SENOKOT-S/PERICOLACE) 8.6-50 MG per tablet 2 tablet  2 tablet Oral BID Greg Trammell MD         Facility-Administered Medications Ordered in Other Encounters   Medication Dose Route Frequency Provider Last Rate Last Admin    dexAMETHasone (DECADRON) injection   Intravenous PRN Carlos Kerr APRN CRNA   8 mg at 02/12/25 2110    fentaNYL (PF) (SUBLIMAZE) injection   Intravenous PRN Carlos Kerr APRN CRNA   100 mcg at 02/12/25 2110    lidocaine 2% injection (MDV)   Intravenous PRN Carlos Kerr APRN CRNA   50 mg at 02/12/25 2110    propofol (DIPRIVAN) injection 10 mg/mL vial   Intravenous PRN Carlos Kerr APRN CRNA   200 mg at 02/12/25 2110             Review of Systems:   The Review of Systems is negative other than noted in the HPI          Physical Exam:   Vitals were reviewed  Temp: 98.4  F (36.9  C) Temp src: Core BP: (!) 155/79 Pulse: 84   Resp: 14 SpO2: 97 % O2 Device: None (Room air)    Constitutional:   Awake, alert, NAD   Eyes:   Eyes open   ENT:   Nc/at   Neck:   No neck mass   Hematologic / Lymphatic:   No bleed/bruise   Back:   Not examined   Lungs:   Nonlabored breathing on room air   Cardiovascular:   Extrem wwp   Abdomen:   Not examined   Chest / Breast:   Not examined   Genitounirinary:   Deferred to OR   Musculoskeletal:   No joint redness or swelling   Neurologic:   YUNG   Neuropsychiatric:   Normal mood and affect   Skin:   No skin rash              Data:   All laboratory and imaging data in the past 24 hours reviewed  Results for orders placed or performed during the hospital encounter of 02/12/25 (from the past 24 hours)   UA with Microscopic reflex to Culture    Specimen: Urine, Midstream   Result Value Ref Range    Color Urine Straw Colorless, Straw, Light Yellow, Yellow    Appearance Urine Clear Clear    Glucose Urine Negative Negative mg/dL    Bilirubin Urine Negative Negative    Ketones Urine Negative Negative mg/dL    Specific Gravity Urine 1.012 1.003 - 1.035    Blood Urine Small (A) Negative    pH Urine 6.5 5.0 - 7.0    Protein Albumin Urine 30 (A) Negative mg/dL    Urobilinogen Urine Normal Normal, 2.0 mg/dL    Nitrite Urine Negative Negative    Leukocyte Esterase Urine Negative Negative    RBC Urine 28 (H) <=2 /HPF    WBC Urine 1 <=5 /HPF    Narrative    Urine Culture not indicated   CBC + differential    Narrative    The following orders were created for panel order CBC + differential.  Procedure                               Abnormality         Status                     ---------                               -----------         ------                     CBC with platelets and d...[[id number]]  Abnormal            Final result                 Please view results for these tests on the individual orders.   Basic metabolic panel (BMP)   Result Value Ref Range    Sodium 136 135 - 145 mmol/L    Potassium 3.9 3.4 - 5.3 mmol/L    Chloride 95 (L) 98 - 107 mmol/L    Carbon Dioxide (CO2) 27 22 - 29 mmol/L    Anion Gap 14 7 - 15 mmol/L    Urea Nitrogen 21.1 8.0 - 23.0 mg/dL    Creatinine 2.10 (H) 0.67 - 1.17 mg/dL    GFR Estimate 36 (L) >60 mL/min/1.73m2    Calcium 9.5 8.8 - 10.4 mg/dL    Glucose 106 (H) 70 - 99 mg/dL   Extra Tube ([place] Draw)    Narrative    The following orders were created for panel order Extra Tube ([place] Draw).  Procedure                               Abnormality         Status                     ---------                                -----------         ------                     Extra Blue Top Tube[[id number]]                              Final result               Extra Red Top Tube[[id number]]                               Final result                 Please view results for these tests on the individual orders.   Lipase   Result Value Ref Range    Lipase 25 13 - 60 U/L   CBC with platelets and differential   Result Value Ref Range    WBC Count 7.7 4.0 - 11.0 10e3/uL    RBC Count 5.20 4.40 - 5.90 10e6/uL    Hemoglobin 13.7 13.3 - 17.7 g/dL    Hematocrit 43.5 40.0 - 53.0 %    MCV 84 78 - 100 fL    MCH 26.3 (L) 26.5 - 33.0 pg    MCHC 31.5 31.5 - 36.5 g/dL    RDW 14.0 10.0 - 15.0 %    Platelet Count 243 150 - 450 10e3/uL    % Neutrophils 73 %    % Lymphocytes 16 %    % Monocytes 10 %    % Eosinophils 0 %    % Basophils 0 %    % Immature Granulocytes 1 %    NRBCs per 100 WBC 0 <1 /100    Absolute Neutrophils 5.6 1.6 - 8.3 10e3/uL    Absolute Lymphocytes 1.2 0.8 - 5.3 10e3/uL    Absolute Monocytes 0.7 0.0 - 1.3 10e3/uL    Absolute Eosinophils 0.0 0.0 - 0.7 10e3/uL    Absolute Basophils 0.0 0.0 - 0.2 10e3/uL    Absolute Immature Granulocytes 0.1 <=0.4 10e3/uL    Absolute NRBCs 0.0 10e3/uL   Extra Blue Top Tube   Result Value Ref Range    Hold Specimen JIC    Extra Red Top Tube   Result Value Ref Range    Hold Specimen JIC    CT Abdomen Pelvis w/o Contrast    Narrative    EXAM: CT ABDOMEN PELVIS W/O CONTRAST  LOCATION: [facility]  DATE: 2/12/2025    INDICATION: left flank pain  COMPARISON: None.  TECHNIQUE: CT scan of the abdomen and pelvis was performed without IV contrast. Multiplanar reformats were obtained. Dose reduction techniques were used.  CONTRAST: None.    FINDINGS:   LOWER CHEST: Tortuous descending thoracic aorta.    HEPATOBILIARY: Diffuse hepatic steatosis. No radiopaque gallstone or biliary ductal dilatation.    PANCREAS: Normal.    SPLEEN: Normal.    ADRENAL GLANDS: Normal.    KIDNEYS/BLADDER: Mild  left hydronephrosis secondary to a 7 mm calculus at the left ureteropelvic junction. Asymmetric left perinephric stranding/edema. Multiple additional nonobstructing left lower pole renal calculi measuring up to 6 mm. No right   hydronephrosis. Nonobstructing 10 mm calculus of the right lower pole kidney. Mild diffuse bladder wall thickening, favored secondary to chronic outlet obstruction in the setting of prostatomegaly.    BOWEL: No obstruction or inflammatory change. Scattered colonic diverticula. Normal appendix.    LYMPH NODES: Normal.    VASCULATURE: No vascular calcifications. No abdominal aortic aneurysm.    PELVIC ORGANS: Prostatomegaly with mass effect on the bladder base.    MUSCULOSKELETAL: There is a 5.1 x 7.5 x 5.5 cm fluid collection in the midline ventral abdominal wall soft tissues immediately superior to the umbilicus. Degenerative changes of the spine.      Impression    IMPRESSION:   1.  Mild left hydronephrosis secondary to a 7 mm calculus at the left ureteropelvic junction. Additional nonobstructing bilateral renal calculi.  2.  Fluid collection in the midline ventral abdominal wall soft tissues immediately superior to the umbilicus. Patient has a history of repair of a supraumbilical abdominal wall hernia and this may represent a postoperative seroma.  3.  Mild diffuse bladder wall thickening which is favored secondary to chronic outlet obstruction in the setting of prostatomegaly but can be correlated with urinalysis.  4.  Hepatic steatosis.     All imaging studies reviewed by me.          Reviewed and interpreted imaging personally. Stones are about 500 HU.           Attestation:  I have reviewed today's vital signs, notes, medications, labs and imaging.    Jaime Vernon MD

## 2025-02-13 NOTE — ED NOTES
"[facility]  ED Nurse Handoff Report    ED Chief complaint: Flank Pain  . ED Diagnosis:   Final diagnoses:   None       Allergies:   Allergies   Allergen Reactions    Lisinopril Cough    Codeine Rash and Hives       Code Status: Full Code    Activity level - Baseline/Home:  independent.  Activity Level - Current:   standby.   Lift room needed: No.   Bariatric: No   Needed: No   Isolation: No.   Infection: Not Applicable.     Respiratory status: Room air    Vital Signs (within 30 minutes):   Vitals:    02/12/25 1445 02/12/25 1608 02/12/25 1609 02/12/25 1638   BP: (!) 151/92 (!) 141/94  (!) 153/97   Pulse: 92 88  87   Resp: 20      Temp: 98  F (36.7  C)      TempSrc: Temporal      SpO2: 100%  98% 99%   Weight: (!) 149.7 kg (330 lb 0.5 oz)      Height: 1.829 m (6')          Cardiac Rhythm:  ,      Pain level:    Patient confused: No.   Patient Falls Risk: nonskid shoes/slippers when out of bed, patient and family education, and assistive device/personal items within reach.   Elimination Status: Has voided     Patient Report - Initial Complaint: flank pain.   Efraín Estrada is a 59 year old male with history of hypertension who presents to the ED for evaluation of left sided flank pain. Efraín reports sudden onset last night and the pain has been constant since. It slightly wraps around his left lower abdomen. The pain prevented him from sleeping last night. Pain worsens with a deep breath. He endorses some slight nausea last night without vomiting and has since resolved. He noticed a few episodes of brown \"tea colored\" urine 10-12 days ago and also notes it has been more difficult to pass stool with decreased output in the mornings. He denies hard stools, diarrhea, fever, chills, chest pain, shortness of breath, or rash. No history of similar symptoms. No history of kidney stones, or issues with kidneys, aorta, or bleeding. No recent medication changes.   Focused Assessment:     L " flank pain began 2200 yesterday. Pain wraps around laterally to LUQ and LLQ. Denies stool changes ex small amounts yesterday due to pain. Afebrile. Denies urinary changes or hx kidney stone. Denies daily alcohol use. ABC in tact. A/OX4        Abnormal Results:   Labs Ordered and Resulted from Time of ED Arrival to Time of ED Departure   ROUTINE UA WITH MICROSCOPIC REFLEX TO CULTURE - Abnormal       Result Value    Color Urine Straw      Appearance Urine Clear      Glucose Urine Negative      Bilirubin Urine Negative      Ketones Urine Negative      Specific Gravity Urine 1.012      Blood Urine Small (*)     pH Urine 6.5      Protein Albumin Urine 30 (*)     Urobilinogen Urine Normal      Nitrite Urine Negative      Leukocyte Esterase Urine Negative      RBC Urine 28 (*)     WBC Urine 1     BASIC METABOLIC PANEL - Abnormal    Sodium 136      Potassium 3.9      Chloride 95 (*)     Carbon Dioxide (CO2) 27      Anion Gap 14      Urea Nitrogen 21.1      Creatinine 2.10 (*)     GFR Estimate 36 (*)     Calcium 9.5      Glucose 106 (*)    CBC WITH PLATELETS AND DIFFERENTIAL - Abnormal    WBC Count 7.7      RBC Count 5.20      Hemoglobin 13.7      Hematocrit 43.5      MCV 84      MCH 26.3 (*)     MCHC 31.5      RDW 14.0      Platelet Count 243      % Neutrophils 73      % Lymphocytes 16      % Monocytes 10      % Eosinophils 0      % Basophils 0      % Immature Granulocytes 1      NRBCs per 100 WBC 0      Absolute Neutrophils 5.6      Absolute Lymphocytes 1.2      Absolute Monocytes 0.7      Absolute Eosinophils 0.0      Absolute Basophils 0.0      Absolute Immature Granulocytes 0.1      Absolute NRBCs 0.0     LIPASE - Normal    Lipase 25          CT Abdomen Pelvis w/o Contrast   Final Result   IMPRESSION:    1.  Mild left hydronephrosis secondary to a 7 mm calculus at the left ureteropelvic junction. Additional nonobstructing bilateral renal calculi.   2.  Fluid collection in the midline ventral abdominal wall soft tissues  immediately superior to the umbilicus. Patient has a history of repair of a supraumbilical abdominal wall hernia and this may represent a postoperative seroma.   3.  Mild diffuse bladder wall thickening which is favored secondary to chronic outlet obstruction in the setting of prostatomegaly but can be correlated with urinalysis.   4.  Hepatic steatosis.          Treatments provided: labs, imaging, see MAR  Family Comments: n/a  OBS brochure/video discussed/provided to patient:  Yes  ED Medications:   Medications   sodium chloride 0.9% BOLUS 1,000 mL (1,000 mLs Intravenous $New Bag 2/12/25 1423)   ibuprofen (ADVIL/MOTRIN) tablet 600 mg (600 mg Oral $Given 2/12/25 1608)   acetaminophen (TYLENOL) tablet 1,000 mg (1,000 mg Oral $Given 2/12/25 1608)       Drips infusing:  No  For the majority of the shift this patient was Green.   Interventions performed were n/a.    Sepsis treatment initiated: No    Cares/treatment/interventions/medications to be completed following ED care: n/a    ED Nurse Name: Celia Albarran RN  6:15 PM

## 2025-02-13 NOTE — OP NOTE
[facility]  Operative Note    Pre-operative diagnosis: Obstruction of left ureteropelvic junction (UPJ) due to stone [N20.1]  Bilateral nephrolithiasis [N20.0]   Post-operative diagnosis Obstruction of left ureteropelvic junction (UPJ) due to stone [N20.1]  Bilateral nephrolithiasis [N20.0]   Procedure: Procedure(s):  CYSTOSCOPY, WITH LEFT RETROGRADE PYELOGRAM AND LEFT URETERAL STENT INSERTION, COMPLEX PULLIAM CATHETER PLACEMENT OVER WIRE  FLUOROSCOPIC INTERPRETATION <1 HOUR PHYSICIAN TIME   Surgeon: Jaime Vernon MD   Assistants(s): NONE   Anesthesia: General    Estimated blood loss: None    Total IV fluids: (See anesthesia record)   Blood transfusion: No transfusion was given during surgery   Total urine output: Not measured   Drains: 18 Fr [place] tip [place]   Specimens: * No specimens in log *     Implants: * No implants in log *       Findings:   Tight penile and bulbar urethra, gently navigated with 20 Fr rigid scope without need for formal dilation  Trilobar obstructive hypertrophy with prominent median lobe, with some polypoid inflammatory lesions overlying the median lobe  Mild left hydronephrosis, stent placed with return of slightly cloudy effluent/stone debris   Complications: None.   Condition: Stable               Description of procedure:  58 yo M with Body mass index is 44.76 kg/m ., HTN, NEGIN, CKD presenting with left abdominal pain and found to have left ureteropelvic junction stone as well as nonobstructive nephrolithiasis bilaterally     I discussed with the patient the need for cystoscopy with left ureteral stent placement to relieve the obstruction on the left side followed by ureteroscopic management to definitively treat the stones after passive dilation of the ureter for 1 to 2 weeks.  I also discussed that he has a large right-sided renal stone.  Based on the Hounsfield units of around 500 likely the stone is uric acid.  I offered prestenting on the right with plan  for either ureteroscopic management or shockwave on the right however if this is uric acid there is a chance that the stone is able to be managed with dissolution therapy alone with potassium citrate alkalinization of the urine.  He opts not to pursue any surgical treatment of the right-sided stone at this time and proceed with left-sided stent placement only.  Risks of stent placement including hematuria, stent pain irritation, infection were discussed.  Informed consent was obtained.    The patient was brought to operating room #14.  Preop antibiotics were given prior to the procedure.  General anesthesia was induced, he was placed in dorsolithotomy position, prepped and draped in standard sterile fashion.  A timeout is performed.    I initially tried to pass a 22 [place] Stortz cystoscope through the urethra however I realized that penile urethra was too narrow for this.  I downsized to a 20 [place] scope and was able to very gently navigated through this narrow area without formal dilation.  There was a second area in the bulbar urethra which was also narrow and again this was able to be gently passed using the scope without formal dilation.  Prostatic urethra had trilobar obstructive hypertrophy with a prominent median lobe.  Notably there was significant polypoid inflammatory changes overlying the median lobe.  There were no obvious papillary tumors or raised erythema in the bladder.  Bladder was mildly trabeculated without significant cellules or diverticuli.  The left ureteral orifice was identified.  This was cannulated with a 5 [place] tiger tail catheter.  Gentle retrograde pyelogram showed mild hydronephrosis.  The wire was passed up to the renal pelvis and the tiger tail was removed.  A stent was placed in the usual fashion under cystoscopic and fluoroscopic guidance with good curls noted proximally and distally.  Given then narrow urethra I opted to place a Jasso catheter and leave it in overnight.  A  wire was left in place in the bladder and the scope was removed.  A [place] tip Jasso was then passed over the wire into the bladder and the wire was removed.  The Jasso was left to gravity drainage.  Patient was cleaned up, awoken from anesthesia and brought to PACU in stable condition.    Jaime Vernon MD   [facility]  [phone] clinic phone

## 2025-02-13 NOTE — PLAN OF CARE
PRIMARY DIAGNOSIS: Cystoscopy- L ureteral stent placement.  OUTPATIENT/OBSERVATION GOALS TO BE MET BEFORE DISCHARGE:  1. Pain Status: Pain free.    2. Return to near baseline physical activity: Yes    3. Cleared for discharge by consultants (if involved): No    Discharge Planner Nurse   Safe discharge environment identified: Yes  Barriers to discharge: Yes         Vitals are Temp: 98.1  F (36.7  C) Temp src: Oral BP: 120/79 Pulse: 69   Resp: 17 SpO2: 97 %.  Patient is Alert and Oriented x4. They are SBA with no assistive devices .  Pt is a Regular diet.  They are denying pain.  Patient has Lactated Ringer's running at 100 mL per hour. Jasso in place tiill 2/13- red/pink oupt noted. Urology following and reassess pt 2/13. 3 L NC oxygen overnight for sleep. Pt lives ind.     Please review provider order for any additional goals.   Nurse to notify provider when observation goals have been met and patient is ready for discharge.Goal Outcome Evaluation:      Plan of Care Reviewed With: patient    Overall Patient Progress: improvingOverall Patient Progress: improving    Outcome Evaluation: A&Ox4, SBA. Jasso cath in place till 2/13. Urology following- post op L ureteral stent placement. LR 100mL/hr running. 3 L NC oxygen. lives ind

## 2025-02-13 NOTE — PLAN OF CARE
Vitals are Temp: 97.9  F (36.6  C) Temp src: Oral BP: 121/79 Pulse: 80   Resp: 17 SpO2: 97 %.    Aox4, denies pain or N/V.  ml/hr R PIV. Regular diet. SBA. Plan  arreola removed and tolerate intake. Plan discharge today.     PRIMARY DIAGNOSIS: Ureteral stent  OUTPATIENT/OBSERVATION GOALS TO BE MET BEFORE DISCHARGE:  ADLs back to baseline: Yes    Activity and level of assistance: Independent    Pain status: Pain free.    Return to near baseline physical activity: Yes     Discharge Planner Nurse   Safe discharge environment identified: Yes  Barriers to discharge: No       Entered by: Fozia Post RN 02/13/2025      Please review provider order for any additional goals.   Nurse to notify provider when observation goals have been met and patient is ready for discharge.

## 2025-02-13 NOTE — ANESTHESIA PROCEDURE NOTES
Airway       Patient location during procedure: OR       Procedure Start/Stop Times: 2/12/2025 9:11 PM  Staff -        CRNA: Carlos Kerr APRN CRNA       Performed By: CRNA  Consent for Airway        Urgency: elective  Indications and Patient Condition       Indications for airway management: mando-procedural       Induction type:intravenous       Mask difficulty assessment: 1 - vent by mask    Final Airway Details       Final airway type: supraglottic airway    Supraglottic Airway Details        Type: LMA       Brand: I-Gel       LMA size: 5    Post intubation assessment        Placement verified by: capnometry, equal breath sounds and chest rise        Number of attempts at approach: 1       Number of other approaches attempted: 0       Secured with: commercial tube patel       Ease of procedure: easy       Dentition: Intact    Medication(s) Administered   Medication Administration Time: 2/12/2025 9:11 PM

## 2025-02-13 NOTE — DISCHARGE SUMMARY
[facility]  Hospitalist Discharge Summary      Date of Admission:  2/12/2025  Date of Discharge:  2/13/2025  Discharging Provider: Shauna Ronquillo PA-C  Discharge Service: Hospitalist Service    Discharge Diagnoses   Renal colic  L obstructing ureteral stone with hydronephrosis  Bilateral nephrolithiasis   KENJI    Clinically Significant Risk Factors     # Severe Obesity: Estimated body mass index is 44.23 kg/m  as calculated from the following:    Height as of this encounter: 1.829 m (6').    Weight as of this encounter: 147.9 kg (326 lb 1.6 oz).       Follow-ups Needed After Discharge   Follow-up Appointments       Follow-up and recommended labs and tests       Follow up with primary care provider, Carlos Alberto Hunter, within 7 days for hospital follow- up.  The following labs/tests are recommended: BMP.   Recheck kidney function  Your kidney function improved but did not return to baseline but we anticipate this will happen with more time. Avoid NSAIDs.   Urology will reach out to set up follow up                Unresulted Labs Ordered in the Past 30 Days of this Admission       No orders found for last 31 day(s).        These results will be followed up by PCP    Discharge Disposition   Discharged to home  Condition at discharge: Stable    Hospital Course   Efraín Estrada is a 59 year old male with history of morbid obesity BMI 44, hypertension, NEGIN, and CKD stage III, who was admitted on 2/12/2025 with abdominal pain and found to have obstructive left ureteral calculi with hydronephrosis.  Urology consulted and took pt to the OR for cystoscopy and placed a L ureteral stent. A arreola catheter was also placed to be left in overnight and was removed the morning of discharge. He was able to void prior to discharge. His renal function improved but did not return to baseline.    Obstructive Left Ureteral Calculi w/ Hydronephrosis  Presents with left flank pain radiating into groin  CT with  evidence of obstructive left ureteral calculi with hydronephrosis   - Urology consulted in ED  - s/p cystoscopy and stent placement  - denies pain this AM. Jasso catheter placed in OR removed this AM and voided without difficulty     KENJI on CKD Stage III  Suspect post-obstructive due to renal stone and possibly pre-renal from poor oral intake.  Cr 2.1, improved to 1.83 after IVFs  - anticipate further improvement in Cr with time. Recheck BMP in 1 week outpatient  - avoid NSAIDs     Postoperative Seroma  Had hernia repair in November at [facility] this admission with evidence of seroma  - No abdominal pain or clinical evidence that this could be infected  - pt is aware of seroma and has discussed with his surgeon and was told this is a normal post operative finding and should continue to resolve on its own     Morbid obesity BMI 44:   - Complicates all aspects of care  Essential hypertension:   - continue home amlodipine.  Losartan-HCTZ held given KENJI, ok to resume on discharge  NAFLD:   Noted on CT.  Recommend weight loss and close follow-up      Consultations This Hospital Stay   UROLOGY IP CONSULT    Code Status   Full Code    Time Spent on this Encounter   I, Shauna Ronquillo PA-C, personally saw the patient today and spent less than or equal to 30 minutes discharging this patient.       Shauna Ronquillo PA-C  [facility] OBSERVATION DEPT  [address] [place] [address]  Phone: [phone]  ______________________________________________________________________    Physical Exam   Vital Signs: Temp: 97.9  F (36.6  C) Temp src: Oral BP: 121/79 Pulse: 80   Resp: 17 SpO2: 97 % O2 Device: None (Room air) Oxygen Delivery: 3 LPM  Weight: 326 lbs 1.6 oz    GENERAL:  Comfortable.  PSYCH: pleasant, oriented, No acute distress.  HEART:  RRR  LUNGS:  Normal Respiratory effort.   EXTREMITIES:  able to ambulate   NEUROLOGIC:  grossly intact         Primary Care Physician   Carlos Alberto DAMON  Elsa    Discharge Orders      Basic metabolic panel     Reason for your hospital stay    Renal colic and acute kidney injury due to obstructing kidney stone. Urology consulted and took you to the OR for cystoscopy. A stent was placed and a arreola catheter placed. The arreola was removed the morning of discharge and you were able to void prior to discharge. You also tolerated oral intake prior to discharge.     Follow-up and recommended labs and tests     Follow up with primary care provider, Carlos Alberto Hunter, within 7 days for hospital follow- up.  The following labs/tests are recommended: BMP.   Recheck kidney function  Your kidney function improved but did not return to baseline but we anticipate this will happen with more time. Avoid NSAIDs.   Urology will reach out to set up follow up     Activity    Your activity upon discharge: activity as tolerated     When to contact your care team    Call your primary doctor if you have any of the following: temperature greater than 101, shortness of breath, increased pain, persistent hematuria or inability to urinate.     Diet    Follow this diet upon discharge: Regular       Significant Results and Procedures   Most Recent 3 CBC's:  Recent Labs   Lab Test 02/13/25  0623 02/12/25  1534 10/25/24  1535   WBC 5.9 7.7 5.4   HGB 12.8* 13.7 13.6   MCV 83 84 86    243 229     Most Recent 3 BMP's:  Recent Labs   Lab Test 02/13/25  0623 02/12/25  1534 10/25/24  1535    136 141   POTASSIUM 4.6 3.9 4.6   CHLORIDE 99 95* 102   CO2 26 27 27   BUN 22.0 21.1 19.8   CR 1.83* 2.10* 1.40*   ANIONGAP 10 14 12   JESSICA 8.9 9.5 9.9   * 106* 97     7-Day Micro Results       No results found for the last 168 hours.          Most Recent Urinalysis:  Recent Labs   Lab Test 02/12/25  1454 10/13/23  1447   COLOR Straw Yellow   APPEARANCE Clear Clear   URINEGLC Negative Negative   URINEBILI Negative Negative   URINEKETONE Negative Negative   SG 1.012 1.015   UBLD Small* Small*   URINEPH 6.5  6.0   PROTEIN 30* Negative   UROBILINOGEN  --  0.2   NITRITE Negative Negative   LEUKEST Negative Negative   RBCU 28* 0-2   WBCU 1 0-5   ,   Results for orders placed or performed during the hospital encounter of 02/12/25   CT Abdomen Pelvis w/o Contrast    Narrative    EXAM: CT ABDOMEN PELVIS W/O CONTRAST  LOCATION: [facility]  DATE: 2/12/2025    INDICATION: left flank pain  COMPARISON: None.  TECHNIQUE: CT scan of the abdomen and pelvis was performed without IV contrast. Multiplanar reformats were obtained. Dose reduction techniques were used.  CONTRAST: None.    FINDINGS:   LOWER CHEST: Tortuous descending thoracic aorta.    HEPATOBILIARY: Diffuse hepatic steatosis. No radiopaque gallstone or biliary ductal dilatation.    PANCREAS: Normal.    SPLEEN: Normal.    ADRENAL GLANDS: Normal.    KIDNEYS/BLADDER: Mild left hydronephrosis secondary to a 7 mm calculus at the left ureteropelvic junction. Asymmetric left perinephric stranding/edema. Multiple additional nonobstructing left lower pole renal calculi measuring up to 6 mm. No right   hydronephrosis. Nonobstructing 10 mm calculus of the right lower pole kidney. Mild diffuse bladder wall thickening, favored secondary to chronic outlet obstruction in the setting of prostatomegaly.    BOWEL: No obstruction or inflammatory change. Scattered colonic diverticula. Normal appendix.    LYMPH NODES: Normal.    VASCULATURE: No vascular calcifications. No abdominal aortic aneurysm.    PELVIC ORGANS: Prostatomegaly with mass effect on the bladder base.    MUSCULOSKELETAL: There is a 5.1 x 7.5 x 5.5 cm fluid collection in the midline ventral abdominal wall soft tissues immediately superior to the umbilicus. Degenerative changes of the spine.      Impression    IMPRESSION:   1.  Mild left hydronephrosis secondary to a 7 mm calculus at the left ureteropelvic junction. Additional nonobstructing bilateral renal calculi.  2.  Fluid collection in the midline  ventral abdominal wall soft tissues immediately superior to the umbilicus. Patient has a history of repair of a supraumbilical abdominal wall hernia and this may represent a postoperative seroma.  3.  Mild diffuse bladder wall thickening which is favored secondary to chronic outlet obstruction in the setting of prostatomegaly but can be correlated with urinalysis.  4.  Hepatic steatosis.   XR Surgery EVANGELINA L/T 5 Min Fluoro w Stills    Narrative    This exam was marked as non-reportable because it will not be read by a   radiologist or a [place] non-radiologist provider.             Discharge Medications   Current Discharge Medication List        CONTINUE these medications which have NOT CHANGED    Details   amLODIPine (NORVASC) 10 MG tablet Take 1 tablet (10 mg) by mouth daily.  Qty: 90 tablet, Refills: 3    Associated Diagnoses: Hypertension, unspecified type      losartan-hydrochlorothiazide (HYZAAR) 100-25 MG tablet Take 1 tablet by mouth daily.  Qty: 90 tablet, Refills: 3    Associated Diagnoses: Hypertension, unspecified type      cyclobenzaprine (FLEXERIL) 10 MG tablet Take 1 tablet (10 mg) by mouth 2 times daily.  Qty: 15 tablet, Refills: 0    Associated Diagnoses: Acute post-operative pain      HYDROcodone-acetaminophen (NORCO) 5-325 MG tablet Take 1 tablet by mouth every 4 hours as needed for moderate pain or severe pain.  Qty: 12 tablet, Refills: 0    Associated Diagnoses: Acute post-operative pain      senna-docusate (SENOKOT-S/PERICOLACE) 8.6-50 MG tablet Take 1 tablet by mouth 2 times daily.  Qty: 15 tablet, Refills: 0    Associated Diagnoses: Acute post-operative pain           Allergies   Allergies   Allergen Reactions    Lisinopril Cough    Codeine Rash and Hives

## 2025-02-13 NOTE — ANESTHESIA CARE TRANSFER NOTE
Patient: Efraín Estrada    Procedure: Procedure(s):  CYSTOSCOPY, WITH LEFT RETROGRADE PYELOGRAM AND LEFT URETERAL STENT INSERTION, arreola placement       Diagnosis: Obstruction of left ureteropelvic junction (UPJ) due to stone [N20.1]  Bilateral nephrolithiasis [N20.0]  Diagnosis Additional Information: No value filed.    Anesthesia Type:   General     Note:    Oropharynx: oral airway in place and spontaneously breathing  Level of Consciousness: drowsy  Oxygen Supplementation: face mask  Level of Supplemental Oxygen (L/min / FiO2): 6  Independent Airway: airway patency satisfactory and stable  Dentition: dentition unchanged  Vital Signs Stable: post-procedure vital signs reviewed and stable  Report to RN Given: handoff report given  Patient transferred to: PACU    Handoff Report: Identifed the Patient, Identified the Reponsible Provider, Reviewed the pertinent medical history, Discussed the surgical course, Reviewed Intra-OP anesthesia mangement and issues during anesthesia, Set expectations for post-procedure period and Allowed opportunity for questions and acknowledgement of understanding      Vitals:  Vitals Value Taken Time   BP     Temp     Pulse 61 02/12/25 2143   Resp 11 02/12/25 2143   SpO2 89 % 02/12/25 2143   Vitals shown include unfiled device data.    Electronically Signed By: LUIS Plascencia CRNA  February 12, 2025  9:44 PM

## 2025-02-13 NOTE — PHARMACY-ADMISSION MEDICATION HISTORY
Pharmacist Admission Medication History    Admission medication history is complete. The information provided in this note is only as accurate as the sources available at the time of the update.    Information Source(s): Patient and CareEverywhere/SureScripts via in-person    Pertinent Information:      Changes made to PTA medication list:  Added: None  Deleted: Norco, Flexeril and Senna S  Changed: None    Allergies reviewed with patient and updates made in EHR: yes    Medication History Completed By: David Shaw RPH 2/13/2025 1:35 PM    PTA Med List   Medication Sig Last Dose/Taking    amLODIPine (NORVASC) 10 MG tablet Take 1 tablet (10 mg) by mouth daily. 2/12/2025    losartan-hydrochlorothiazide (HYZAAR) 100-25 MG tablet Take 1 tablet by mouth daily. 2/12/2025

## 2025-02-13 NOTE — PLAN OF CARE
Vitals are Temp: 97.9  F (36.6  C) Temp src: Oral BP: 121/79 Pulse: 80   Resp: 17 SpO2: 97 %.    Aox4, denies pain or N/V.  ml/hr R PIV. Regular diet. SBA. Plan remove arreola and tolerate intake.     PRIMARY DIAGNOSIS: Ureteral stent  OUTPATIENT/OBSERVATION GOALS TO BE MET BEFORE DISCHARGE:  ADLs back to baseline: Yes    Activity and level of assistance: Up with standby assistance.    Pain status: Pain free.    Return to near baseline physical activity: Yes     Discharge Planner Nurse   Safe discharge environment identified: Yes  Barriers to discharge: Yes       Entered by: Fozia Post RN 02/13/2025      Please review provider order for any additional goals.   Nurse to notify provider when observation goals have been met and patient is ready for discharge.

## 2025-02-13 NOTE — ED NOTES
[facility]    ED Boarding Nurse Handoff Addendum Report:    Date/time: 2/12/2025, 8:22 PM    Report given to PACU    Neuro: Alert and Oriented x4  Activity: are SBA with no assistive devices   Telemetry Monitoring: No  Pain:4/10 in left back/flank.   Labs / Tests:   GI: increased effort, pt states feeling constipated, not passing flatus  : voiding without pain/difficulty  O2: ra  LDA's: Peripheral  Fluids: is Saline locked.  Diet: NPO  Consults: Urology  Discharge Disposition:  2/12-2/13      Vital signs (within last 30 minutes):    Vitals:    02/12/25 1608 02/12/25 1609 02/12/25 1638 02/12/25 1959   BP: (!) 141/94  (!) 153/97 (!) 145/102   BP Location:    Left arm   Pulse: 88  87 85   Resp:    16   Temp:    97.6  F (36.4  C)   TempSrc:    Oral   SpO2:  98% 99% 97%   Weight:       Height:           ED Boarding Nurse name: Belkys Luis RN

## 2025-02-17 ENCOUNTER — PATIENT OUTREACH (OUTPATIENT)
Dept: INTERNAL MEDICINE | Facility: CLINIC | Age: 60
End: 2025-02-17
Payer: COMMERCIAL

## 2025-02-17 NOTE — TELEPHONE ENCOUNTER
Transitions of Care Outreach  No chief complaint on file.      Most Recent Admission Date: 2/12/2025   Most Recent Admission Diagnosis: Acute kidney injury - N17.9  Hydronephrosis with urinary obstruction due to ureteral calculus - N13.2  Obstruction of left ureteropelvic junction (UPJ) due to stone - N20.1  Bilateral nephrolithiasis - N20.0  Hypertension, unspecified type - I10     Most Recent Discharge Date: 2/13/2025   Most Recent Discharge Diagnosis: Obstruction of left ureteropelvic junction (UPJ) due to stone - N20.1  Bilateral nephrolithiasis - N20.0  Hydronephrosis with urinary obstruction due to ureteral calculus - N13.2  Acute kidney injury - N17.9  Hypertension, unspecified type - I10     Transitions of Care Assessment    Discharge Assessment  How are you doing now that you are home?: Doing well, stable. Urinating is more frequent and uncomfortable, but expected.  How are your symptoms? (Red Flag symptoms escalate to triage hotline per guidelines): Improved  Do you know how to contact your clinic care team if you have future questions or changes to your health status? : Yes  Does the patient have their discharge instructions? : Yes  Does the patient have questions regarding their discharge instructions? : No  Were you started on any new medications or were there changes to any of your previous medications? : No  Does the patient have all of their medications?: Yes  Do you have questions regarding any of your medications? : No  Do you have all of your needed medical supplies or equipment (DME)?  (i.e. oxygen tank, CPAP, cane, etc.): Yes    Follow up Plan     Discharge Follow-Up  Discharge follow up appointment scheduled in alignment with recommended follow up timeframe or Transitions of Risk Category? (Low = within 30 days; Moderate= within 14 days; High= within 7 days): Yes  Discharge Follow Up Appointment Date: 02/26/25  Discharge Follow Up Appointment Scheduled with?: Primary Care Provider    Future  Appointments   Date Time Provider Department Center   2/20/2025  9:45 AM [facility]   2/26/2025  1:00 PM Carlos Alberto Hunter MD [facility]   3/3/2025  1:30 PM Jaime Vernon MD [facility]       Outpatient Plan as outlined on AVS reviewed with patient.    For any urgent concerns, please contact our 24 hour nurse triage line: [phone] ([phone])       Evelin Portillo RN

## 2025-02-20 ENCOUNTER — LAB (OUTPATIENT)
Dept: LAB | Facility: CLINIC | Age: 60
End: 2025-02-20
Payer: COMMERCIAL

## 2025-02-20 DIAGNOSIS — N13.2 HYDRONEPHROSIS WITH URINARY OBSTRUCTION DUE TO URETERAL CALCULUS: ICD-10-CM

## 2025-02-20 DIAGNOSIS — N17.9 ACUTE KIDNEY INJURY: ICD-10-CM

## 2025-02-20 LAB
ANION GAP SERPL CALCULATED.3IONS-SCNC: 15 MMOL/L (ref 7–15)
BUN SERPL-MCNC: 25.9 MG/DL (ref 8–23)
CALCIUM SERPL-MCNC: 9.8 MG/DL (ref 8.8–10.4)
CHLORIDE SERPL-SCNC: 103 MMOL/L (ref 98–107)
CREAT SERPL-MCNC: 1.67 MG/DL (ref 0.67–1.17)
EGFRCR SERPLBLD CKD-EPI 2021: 47 ML/MIN/1.73M2
GLUCOSE SERPL-MCNC: 109 MG/DL (ref 70–99)
HCO3 SERPL-SCNC: 25 MMOL/L (ref 22–29)
POTASSIUM SERPL-SCNC: 5.1 MMOL/L (ref 3.4–5.3)
SODIUM SERPL-SCNC: 143 MMOL/L (ref 135–145)

## 2025-02-22 ENCOUNTER — ANESTHESIA EVENT (OUTPATIENT)
Dept: SURGERY | Facility: CLINIC | Age: 60
End: 2025-02-22
Payer: COMMERCIAL

## 2025-02-23 NOTE — ANESTHESIA PREPROCEDURE EVALUATION
Anesthesia Pre-Procedure Evaluation    Patient: Efraín Estrada   MRN: [id number] : 1965        Procedure : Procedure(s):  cystoscopy, left ureteroscopy with laser lithotripsy, left ureteroscopy with stone basketing, left retrograde pyelogram, left ureteral stent exchange          Past Medical History:   Diagnosis Date    HTN (hypertension)     Morbid obesity (H) 2022    NEGIN (obstructive sleep apnea) 2022    Tibia fracture     left       Past Surgical History:   Procedure Laterality Date    COLONOSCOPY  2023    CYSTOSCOPY, RETROGRADES, INSERT STENT URETER(S), COMBINED Left 2025    Procedure: CYSTOSCOPY, WITH LEFT RETROGRADE PYELOGRAM AND LEFT URETERAL STENT INSERTION, COMPLEX PULLIAM CATHETER PLACEMENT OVER WIRE FLUOROSCOPIC INTERPRETATION <1 HOUR PHYSICIAN TIME;  Surgeon: Jaime Vernon MD;  Location: [facility] OR    DAVINCI XI HERNIORRHAPHY VENTRAL N/A 2024    Procedure: Robotic assisted laparoscopic ventral hernia repair;  Surgeon: Michael Flor MD;  Location: [facility] OR    INSERT CATHETER BLADDER N/A 2025    Procedure: Insert catheter bladder;  Surgeon: Jaime Vernon MD;  Location: [facility] OR    TONSILLECTOMY  2012    inflammed; causing breathing problems with sleeping     ZZHC CLOSED TX TIBIAL SHAFT FX W/O MANIPULATION  2001    plates removed since left       Allergies   Allergen Reactions    Lisinopril Cough    Codeine Rash and Hives      Social History     Tobacco Use    Smoking status: Former     Current packs/day: 0.00     Average packs/day: 0.3 packs/day for 35.7 years (8.9 ttl pk-yrs)     Types: Cigarettes     Start date: 1986     Quit date: 2021     Years since quitting: 3.4    Smokeless tobacco: Never    Tobacco comments:     Never smoked a pack a day. Would often go days without smoking. I was a passive smoker.   Substance Use Topics    Alcohol use: Yes     Alcohol/week: 12.0 standard drinks of alcohol     Types: 12 Cans of beer per week      Comment: Couple of beers daily      Wt Readings from Last 1 Encounters:   02/12/25 (!) 147.9 kg (326 lb 1.6 oz)      EKG - 2015 - SR              2022 - SR  Anesthesia Evaluation   Pt has had prior anesthetic.     History of anesthetic complications       ROS/MED HX  ENT/Pulmonary:     (+) sleep apnea, uses CPAP,                                   (-) recent URI   Neurologic:    (-) no seizures, no CVA and migraines   Cardiovascular:     (+)  hypertension- -   -  - -                                   (-) CHF and orthopnea/PND   METS/Exercise Tolerance:     Hematologic:  - neg hematologic  ROS     Musculoskeletal:  - neg musculoskeletal ROS     GI/Hepatic:  - neg GI/hepatic ROS     Renal/Genitourinary:     (+) renal disease (KENJI on CKD), type: CRI,     Nephrolithiasis  (obstructing stone, not septic),       Endo:     (+)               Obesity (BMI 44),    (-) Type II DM and thyroid disease   Psychiatric/Substance Use:  - neg psychiatric ROS     Infectious Disease:  - neg infectious disease ROS     Malignancy:       Other:  - neg other ROS          Physical Exam    Airway  airway exam normal      Mallampati: II   TM distance: > 3 FB   Neck ROM: full     Respiratory Devices and Support         Dental       (+) Minor Abnormalities - some fillings, tiny chips      Cardiovascular   cardiovascular exam normal       Rhythm and rate: regular and normal     Pulmonary   pulmonary exam normal        breath sounds clear to auscultation           OUTSIDE LABS:  CBC:   Lab Results   Component Value Date    WBC 5.9 02/13/2025    WBC 7.7 02/12/2025    HGB 12.8 (L) 02/13/2025    HGB 13.7 02/12/2025    HCT 38.8 (L) 02/13/2025    HCT 43.5 02/12/2025     02/13/2025     02/12/2025     BMP:   Lab Results   Component Value Date     02/20/2025     02/13/2025    POTASSIUM 5.1 02/20/2025    POTASSIUM 4.6 02/13/2025    CHLORIDE 103 02/20/2025    CHLORIDE 99 02/13/2025    CO2 25 02/20/2025    CO2 26 02/13/2025     "BUN 25.9 (H) 02/20/2025    BUN 22.0 02/13/2025    CR 1.67 (H) 02/20/2025    CR 1.83 (H) 02/13/2025     (H) 02/20/2025     (H) 02/13/2025     COAGS: No results found for: \"PTT\", \"INR\", \"FIBR\"  POC: No results found for: \"BGM\", \"HCG\", \"HCGS\"  HEPATIC:   Lab Results   Component Value Date    ALBUMIN 4.6 06/19/2023    PROTTOTAL 7.8 06/19/2023    ALT 21 06/19/2023    AST 20 06/19/2023    ALKPHOS 73 06/19/2023    BILITOTAL 0.2 06/19/2023     OTHER:   Lab Results   Component Value Date    JESSICA 9.8 02/20/2025    LIPASE 25 02/12/2025    TSH 0.90 05/06/2022    T4 0.81 11/13/2015       Anesthesia Plan    ASA Status:  3    NPO Status:  NPO Appropriate    Anesthesia Type: General.     - Airway: ETT   Induction: Propofol.   Maintenance: Balanced.   Techniques and Equipment:     - Airway: Video-Laryngoscope       Consents    Anesthesia Plan(s) and associated risks, benefits, and realistic alternatives discussed. Questions answered and patient/representative(s) expressed understanding.     - Discussed:     - Discussed with:  Patient            Postoperative Care    Pain management: Multi-modal analgesia.   PONV prophylaxis: Ondansetron (or other 5HT-3), Dexamethasone or Solumedrol     Comments:               Greg Guillermo MD    I have reviewed the pertinent notes and labs in the chart from the past 30 days and (re)examined the patient.  Any updates or changes from those notes are reflected in this note.    Clinically Significant Risk Factors Present on Admission                   # Hypertension: Noted on problem list           # Severe Obesity: Estimated body mass index is 44.23 kg/m  as calculated from the following:    Height as of 2/12/25: 1.829 m (6').    Weight as of 2/12/25: 147.9 kg (326 lb 1.6 oz).                "

## 2025-02-24 ENCOUNTER — HOSPITAL ENCOUNTER (OUTPATIENT)
Facility: CLINIC | Age: 60
Discharge: HOME | End: 2025-02-24
Attending: STUDENT IN AN ORGANIZED HEALTH CARE EDUCATION/TRAINING PROGRAM | Admitting: STUDENT IN AN ORGANIZED HEALTH CARE EDUCATION/TRAINING PROGRAM
Payer: COMMERCIAL

## 2025-02-24 ENCOUNTER — ANESTHESIA (OUTPATIENT)
Dept: SURGERY | Facility: CLINIC | Age: 60
End: 2025-02-24
Payer: COMMERCIAL

## 2025-02-24 ENCOUNTER — APPOINTMENT (OUTPATIENT)
Dept: GENERAL RADIOLOGY | Facility: CLINIC | Age: 60
End: 2025-02-24
Attending: STUDENT IN AN ORGANIZED HEALTH CARE EDUCATION/TRAINING PROGRAM
Payer: COMMERCIAL

## 2025-02-24 VITALS
HEART RATE: 71 BPM | DIASTOLIC BLOOD PRESSURE: 80 MMHG | RESPIRATION RATE: 16 BRPM | BODY MASS INDEX: 42.66 KG/M2 | SYSTOLIC BLOOD PRESSURE: 124 MMHG | HEIGHT: 72 IN | OXYGEN SATURATION: 99 % | TEMPERATURE: 96.8 F | WEIGHT: 315 LBS

## 2025-02-24 DIAGNOSIS — N20.0 KIDNEY STONE ON LEFT SIDE: Primary | ICD-10-CM

## 2025-02-24 PROCEDURE — 255N000002 HC RX 255 OP 636: Performed by: STUDENT IN AN ORGANIZED HEALTH CARE EDUCATION/TRAINING PROGRAM

## 2025-02-24 PROCEDURE — 710N000012 HC RECOVERY PHASE 2, PER MINUTE: Performed by: STUDENT IN AN ORGANIZED HEALTH CARE EDUCATION/TRAINING PROGRAM

## 2025-02-24 PROCEDURE — 710N000009 HC RECOVERY PHASE 1, LEVEL 1, PER MIN: Performed by: STUDENT IN AN ORGANIZED HEALTH CARE EDUCATION/TRAINING PROGRAM

## 2025-02-24 PROCEDURE — C1769 GUIDE WIRE: HCPCS | Performed by: STUDENT IN AN ORGANIZED HEALTH CARE EDUCATION/TRAINING PROGRAM

## 2025-02-24 PROCEDURE — 272N000001 HC OR GENERAL SUPPLY STERILE: Performed by: STUDENT IN AN ORGANIZED HEALTH CARE EDUCATION/TRAINING PROGRAM

## 2025-02-24 PROCEDURE — 999N000141 HC STATISTIC PRE-PROCEDURE NURSING ASSESSMENT: Performed by: STUDENT IN AN ORGANIZED HEALTH CARE EDUCATION/TRAINING PROGRAM

## 2025-02-24 PROCEDURE — C1758 CATHETER, URETERAL: HCPCS | Performed by: STUDENT IN AN ORGANIZED HEALTH CARE EDUCATION/TRAINING PROGRAM

## 2025-02-24 PROCEDURE — 250N000011 HC RX IP 250 OP 636: Performed by: NURSE ANESTHETIST, CERTIFIED REGISTERED

## 2025-02-24 PROCEDURE — 370N000017 HC ANESTHESIA TECHNICAL FEE, PER MIN: Performed by: STUDENT IN AN ORGANIZED HEALTH CARE EDUCATION/TRAINING PROGRAM

## 2025-02-24 PROCEDURE — 250N000025 HC SEVOFLURANE, PER MIN: Performed by: STUDENT IN AN ORGANIZED HEALTH CARE EDUCATION/TRAINING PROGRAM

## 2025-02-24 PROCEDURE — 999N000179 XR SURGERY CARM FLUORO LESS THAN 5 MIN W STILLS

## 2025-02-24 PROCEDURE — 250N000011 HC RX IP 250 OP 636: Performed by: STUDENT IN AN ORGANIZED HEALTH CARE EDUCATION/TRAINING PROGRAM

## 2025-02-24 PROCEDURE — 250N000009 HC RX 250: Performed by: NURSE ANESTHETIST, CERTIFIED REGISTERED

## 2025-02-24 PROCEDURE — 360N000077 HC SURGERY LEVEL 4, PER MIN: Performed by: STUDENT IN AN ORGANIZED HEALTH CARE EDUCATION/TRAINING PROGRAM

## 2025-02-24 PROCEDURE — C1894 INTRO/SHEATH, NON-LASER: HCPCS | Performed by: STUDENT IN AN ORGANIZED HEALTH CARE EDUCATION/TRAINING PROGRAM

## 2025-02-24 PROCEDURE — 258N000001 HC RX 258: Performed by: STUDENT IN AN ORGANIZED HEALTH CARE EDUCATION/TRAINING PROGRAM

## 2025-02-24 PROCEDURE — 250N000013 HC RX MED GY IP 250 OP 250 PS 637: Performed by: STUDENT IN AN ORGANIZED HEALTH CARE EDUCATION/TRAINING PROGRAM

## 2025-02-24 PROCEDURE — 258N000003 HC RX IP 258 OP 636: Performed by: ANESTHESIOLOGY

## 2025-02-24 PROCEDURE — 250N000009 HC RX 250: Performed by: STUDENT IN AN ORGANIZED HEALTH CARE EDUCATION/TRAINING PROGRAM

## 2025-02-24 PROCEDURE — 258N000003 HC RX IP 258 OP 636: Performed by: NURSE ANESTHETIST, CERTIFIED REGISTERED

## 2025-02-24 PROCEDURE — 82365 CALCULUS SPECTROSCOPY: CPT | Performed by: STUDENT IN AN ORGANIZED HEALTH CARE EDUCATION/TRAINING PROGRAM

## 2025-02-24 PROCEDURE — C2617 STENT, NON-COR, TEM W/O DEL: HCPCS | Performed by: STUDENT IN AN ORGANIZED HEALTH CARE EDUCATION/TRAINING PROGRAM

## 2025-02-24 DEVICE — URETERAL STENT WITH SIDE HOLES 6FX26CM
Type: IMPLANTABLE DEVICE | Site: URETER | Status: FUNCTIONAL
Brand: TRIA™ FIRM

## 2025-02-24 RX ORDER — TAMSULOSIN HYDROCHLORIDE 0.4 MG/1
0.4 CAPSULE ORAL DAILY
Qty: 7 CAPSULE | Refills: 0 | Status: SHIPPED | OUTPATIENT
Start: 2025-02-24

## 2025-02-24 RX ORDER — DEXAMETHASONE SODIUM PHOSPHATE 4 MG/ML
4 INJECTION, SOLUTION INTRA-ARTICULAR; INTRALESIONAL; INTRAMUSCULAR; INTRAVENOUS; SOFT TISSUE
Status: DISCONTINUED | OUTPATIENT
Start: 2025-02-24 | End: 2025-02-24 | Stop reason: HOSPADM

## 2025-02-24 RX ORDER — DEXAMETHASONE SODIUM PHOSPHATE 4 MG/ML
INJECTION, SOLUTION INTRA-ARTICULAR; INTRALESIONAL; INTRAMUSCULAR; INTRAVENOUS; SOFT TISSUE PRN
Status: DISCONTINUED | OUTPATIENT
Start: 2025-02-24 | End: 2025-02-24

## 2025-02-24 RX ORDER — ONDANSETRON 4 MG/1
4 TABLET, ORALLY DISINTEGRATING ORAL EVERY 30 MIN PRN
Status: DISCONTINUED | OUTPATIENT
Start: 2025-02-24 | End: 2025-02-24 | Stop reason: HOSPADM

## 2025-02-24 RX ORDER — CEFAZOLIN SODIUM/WATER 3 G/30 ML
3 SYRINGE (ML) INTRAVENOUS
Status: COMPLETED | OUTPATIENT
Start: 2025-02-24 | End: 2025-02-24

## 2025-02-24 RX ORDER — PROPOFOL 10 MG/ML
INJECTION, EMULSION INTRAVENOUS PRN
Status: DISCONTINUED | OUTPATIENT
Start: 2025-02-24 | End: 2025-02-24

## 2025-02-24 RX ORDER — FENTANYL CITRATE 0.05 MG/ML
50 INJECTION, SOLUTION INTRAMUSCULAR; INTRAVENOUS EVERY 5 MIN PRN
Status: DISCONTINUED | OUTPATIENT
Start: 2025-02-24 | End: 2025-02-24 | Stop reason: HOSPADM

## 2025-02-24 RX ORDER — NALOXONE HYDROCHLORIDE 0.4 MG/ML
0.1 INJECTION, SOLUTION INTRAMUSCULAR; INTRAVENOUS; SUBCUTANEOUS
Status: DISCONTINUED | OUTPATIENT
Start: 2025-02-24 | End: 2025-02-24 | Stop reason: HOSPADM

## 2025-02-24 RX ORDER — ACETAMINOPHEN 650 MG/1
650 SUPPOSITORY RECTAL ONCE
Status: COMPLETED | OUTPATIENT
Start: 2025-02-24 | End: 2025-02-24

## 2025-02-24 RX ORDER — CIPROFLOXACIN 500 MG/1
500 TABLET, FILM COATED ORAL ONCE
Qty: 1 TABLET | Refills: 0 | Status: SHIPPED | OUTPATIENT
Start: 2025-02-24 | End: 2025-02-24

## 2025-02-24 RX ORDER — ONDANSETRON 2 MG/ML
INJECTION INTRAMUSCULAR; INTRAVENOUS PRN
Status: DISCONTINUED | OUTPATIENT
Start: 2025-02-24 | End: 2025-02-24

## 2025-02-24 RX ORDER — LIDOCAINE 40 MG/G
CREAM TOPICAL
Status: DISCONTINUED | OUTPATIENT
Start: 2025-02-24 | End: 2025-02-24 | Stop reason: HOSPADM

## 2025-02-24 RX ORDER — IOPAMIDOL 612 MG/ML
INJECTION, SOLUTION INTRAVASCULAR PRN
Status: DISCONTINUED | OUTPATIENT
Start: 2025-02-24 | End: 2025-02-24 | Stop reason: HOSPADM

## 2025-02-24 RX ORDER — FENTANYL CITRATE 50 UG/ML
INJECTION, SOLUTION INTRAMUSCULAR; INTRAVENOUS PRN
Status: DISCONTINUED | OUTPATIENT
Start: 2025-02-24 | End: 2025-02-24

## 2025-02-24 RX ORDER — DEXMEDETOMIDINE HYDROCHLORIDE 4 UG/ML
INJECTION, SOLUTION INTRAVENOUS PRN
Status: DISCONTINUED | OUTPATIENT
Start: 2025-02-24 | End: 2025-02-24

## 2025-02-24 RX ORDER — ACETAMINOPHEN 500 MG
1000 TABLET ORAL EVERY 6 HOURS PRN
Qty: 100 TABLET | Refills: 0 | Status: SHIPPED | OUTPATIENT
Start: 2025-02-24

## 2025-02-24 RX ORDER — MAGNESIUM HYDROXIDE 1200 MG/15ML
LIQUID ORAL PRN
Status: DISCONTINUED | OUTPATIENT
Start: 2025-02-24 | End: 2025-02-24 | Stop reason: HOSPADM

## 2025-02-24 RX ORDER — DOCUSATE SODIUM 100 MG/1
100 CAPSULE, LIQUID FILLED ORAL 2 TIMES DAILY
Qty: 30 CAPSULE | Refills: 0 | Status: SHIPPED | OUTPATIENT
Start: 2025-02-24

## 2025-02-24 RX ORDER — FENTANYL CITRATE 0.05 MG/ML
25 INJECTION, SOLUTION INTRAMUSCULAR; INTRAVENOUS
Status: DISCONTINUED | OUTPATIENT
Start: 2025-02-24 | End: 2025-02-24 | Stop reason: HOSPADM

## 2025-02-24 RX ORDER — ACETAMINOPHEN 325 MG/1
975 TABLET ORAL ONCE
Status: COMPLETED | OUTPATIENT
Start: 2025-02-24 | End: 2025-02-24

## 2025-02-24 RX ORDER — HYDROMORPHONE HCL IN WATER/PF 6 MG/30 ML
0.4 PATIENT CONTROLLED ANALGESIA SYRINGE INTRAVENOUS EVERY 5 MIN PRN
Status: DISCONTINUED | OUTPATIENT
Start: 2025-02-24 | End: 2025-02-24 | Stop reason: HOSPADM

## 2025-02-24 RX ORDER — ONDANSETRON 2 MG/ML
4 INJECTION INTRAMUSCULAR; INTRAVENOUS EVERY 30 MIN PRN
Status: DISCONTINUED | OUTPATIENT
Start: 2025-02-24 | End: 2025-02-24 | Stop reason: HOSPADM

## 2025-02-24 RX ORDER — FENTANYL CITRATE 0.05 MG/ML
25 INJECTION, SOLUTION INTRAMUSCULAR; INTRAVENOUS EVERY 5 MIN PRN
Status: DISCONTINUED | OUTPATIENT
Start: 2025-02-24 | End: 2025-02-24 | Stop reason: HOSPADM

## 2025-02-24 RX ORDER — CEFAZOLIN SODIUM/WATER 3 G/30 ML
3 SYRINGE (ML) INTRAVENOUS SEE ADMIN INSTRUCTIONS
Status: DISCONTINUED | OUTPATIENT
Start: 2025-02-24 | End: 2025-02-24 | Stop reason: HOSPADM

## 2025-02-24 RX ORDER — SODIUM CHLORIDE, SODIUM LACTATE, POTASSIUM CHLORIDE, CALCIUM CHLORIDE 600; 310; 30; 20 MG/100ML; MG/100ML; MG/100ML; MG/100ML
INJECTION, SOLUTION INTRAVENOUS CONTINUOUS
Status: DISCONTINUED | OUTPATIENT
Start: 2025-02-24 | End: 2025-02-24 | Stop reason: HOSPADM

## 2025-02-24 RX ORDER — SODIUM CHLORIDE, SODIUM LACTATE, POTASSIUM CHLORIDE, CALCIUM CHLORIDE 600; 310; 30; 20 MG/100ML; MG/100ML; MG/100ML; MG/100ML
INJECTION, SOLUTION INTRAVENOUS CONTINUOUS PRN
Status: DISCONTINUED | OUTPATIENT
Start: 2025-02-24 | End: 2025-02-24

## 2025-02-24 RX ORDER — MEPERIDINE HYDROCHLORIDE 25 MG/ML
12.5 INJECTION INTRAMUSCULAR; INTRAVENOUS; SUBCUTANEOUS EVERY 5 MIN PRN
Status: DISCONTINUED | OUTPATIENT
Start: 2025-02-24 | End: 2025-02-24 | Stop reason: HOSPADM

## 2025-02-24 RX ORDER — HYDROMORPHONE HCL IN WATER/PF 6 MG/30 ML
0.2 PATIENT CONTROLLED ANALGESIA SYRINGE INTRAVENOUS EVERY 5 MIN PRN
Status: DISCONTINUED | OUTPATIENT
Start: 2025-02-24 | End: 2025-02-24 | Stop reason: HOSPADM

## 2025-02-24 RX ORDER — LIDOCAINE HYDROCHLORIDE 20 MG/ML
INJECTION, SOLUTION INFILTRATION; PERINEURAL PRN
Status: DISCONTINUED | OUTPATIENT
Start: 2025-02-24 | End: 2025-02-24

## 2025-02-24 RX ORDER — OXYCODONE HYDROCHLORIDE 5 MG/1
5 TABLET ORAL EVERY 6 HOURS PRN
Qty: 6 TABLET | Refills: 0 | Status: SHIPPED | OUTPATIENT
Start: 2025-02-24 | End: 2025-02-27

## 2025-02-24 RX ADMIN — ROCURONIUM BROMIDE 10 MG: 50 INJECTION, SOLUTION INTRAVENOUS at 10:02

## 2025-02-24 RX ADMIN — PHENYLEPHRINE HYDROCHLORIDE 100 MCG: 10 INJECTION INTRAVENOUS at 09:55

## 2025-02-24 RX ADMIN — PROPOFOL 100 MG: 10 INJECTION, EMULSION INTRAVENOUS at 09:18

## 2025-02-24 RX ADMIN — ACETAMINOPHEN 975 MG: 325 TABLET, FILM COATED ORAL at 07:19

## 2025-02-24 RX ADMIN — DEXAMETHASONE SODIUM PHOSPHATE 4 MG: 4 INJECTION, SOLUTION INTRA-ARTICULAR; INTRALESIONAL; INTRAMUSCULAR; INTRAVENOUS; SOFT TISSUE at 09:12

## 2025-02-24 RX ADMIN — PHENYLEPHRINE HYDROCHLORIDE 100 MCG: 10 INJECTION INTRAVENOUS at 09:42

## 2025-02-24 RX ADMIN — MIDAZOLAM 2 MG: 1 INJECTION INTRAMUSCULAR; INTRAVENOUS at 09:11

## 2025-02-24 RX ADMIN — ROCURONIUM BROMIDE 10 MG: 50 INJECTION, SOLUTION INTRAVENOUS at 09:56

## 2025-02-24 RX ADMIN — FENTANYL CITRATE 50 MCG: 50 INJECTION INTRAMUSCULAR; INTRAVENOUS at 09:21

## 2025-02-24 RX ADMIN — Medication 3 G: at 09:08

## 2025-02-24 RX ADMIN — SUGAMMADEX 300 MG: 100 INJECTION, SOLUTION INTRAVENOUS at 10:24

## 2025-02-24 RX ADMIN — FENTANYL CITRATE 50 MCG: 50 INJECTION INTRAMUSCULAR; INTRAVENOUS at 09:12

## 2025-02-24 RX ADMIN — PHENYLEPHRINE HYDROCHLORIDE 100 MCG: 10 INJECTION INTRAVENOUS at 09:33

## 2025-02-24 RX ADMIN — LIDOCAINE HYDROCHLORIDE 100 MG: 20 INJECTION, SOLUTION INFILTRATION; PERINEURAL at 09:12

## 2025-02-24 RX ADMIN — SODIUM CHLORIDE, POTASSIUM CHLORIDE, SODIUM LACTATE AND CALCIUM CHLORIDE: 600; 310; 30; 20 INJECTION, SOLUTION INTRAVENOUS at 09:05

## 2025-02-24 RX ADMIN — PROPOFOL 200 MG: 10 INJECTION, EMULSION INTRAVENOUS at 09:12

## 2025-02-24 RX ADMIN — DEXMEDETOMIDINE HYDROCHLORIDE 8 MCG: 200 INJECTION INTRAVENOUS at 10:02

## 2025-02-24 RX ADMIN — SODIUM CHLORIDE, POTASSIUM CHLORIDE, SODIUM LACTATE AND CALCIUM CHLORIDE: 600; 310; 30; 20 INJECTION, SOLUTION INTRAVENOUS at 07:20

## 2025-02-24 RX ADMIN — ROCURONIUM BROMIDE 50 MG: 50 INJECTION, SOLUTION INTRAVENOUS at 09:12

## 2025-02-24 RX ADMIN — ONDANSETRON 4 MG: 2 INJECTION INTRAMUSCULAR; INTRAVENOUS at 10:17

## 2025-02-24 ASSESSMENT — ACTIVITIES OF DAILY LIVING (ADL)
ADLS_ACUITY_SCORE: 41
ADLS_ACUITY_SCORE: 18

## 2025-02-24 ASSESSMENT — ENCOUNTER SYMPTOMS
SEIZURES: 0
ORTHOPNEA: 0

## 2025-02-24 NOTE — OP NOTE
[facility]  Operative Note    Pre-operative diagnosis: Left ureteral stone [N20.1]   Post-operative diagnosis Left renal stones   Procedure: Procedure(s):  cystoscopy, left ureteroscopy with laser lithotripsy, left ureteroscopy with stone basketing, left retrograde pyelogram, left ureteral stent exchange  Fluoroscopic interpretation <1 hour physician time   Surgeon: Jaime Vernon MD   Assistants(s): none   Anesthesia: General    Estimated blood loss: Minimal    Total IV fluids: (See anesthesia record)   Blood transfusion: No transfusion was given during surgery   Total urine output: Not measured   Drains: Left ureteral stent   Specimens: ID Type Source Tests Collected by Time Destination   A : LEFT KIDNEY STONE Calculus/Stone Kidney, Left STONE ANALYSIS Jaime Vernon MD 2/24/2025 10:10 AM         Implants: Implant Name Type Inv. Item Serial No.  Lot No. LRB No. Used Action   tria firm stent 6f x 26 cm Stent   Must See [place] [employer] [id number]  1 Explanted   tria firm ureteral stent Stent   [employer] [id number] Left 1 Implanted          Findings:   Numerous left renal stones including large 8 mm stone (retropulsed UPJ stone) and additional 3-4 mm lower pole stones, laser dusted/fragmented/basketed, 90% stone free   Complications: None.   Condition: Stable               Description of procedure:  39-year-old man with left ureteropelvic junction stone and bilateral nonobstructive nephrolithiasis status post stent placement 2/12/2025 who presents today for definitive ureteroscopic management of the left renal stones.  He has an asymptomatic large right renal stone but likely will try dissolution therapy for this pending likely diagnosis of uric acid stone.  Risks of surgery including need for second procedure, infection, hematuria, incomplete stone clearance, pain and irritation, ureteral injury were discussed.  Informed consent was obtained.    The patient was brought  to operating room # 19.  Preop antibiotics were given prior to the procedure.  General anesthesia was induced, he was placed in dorsolithotomy position, prepped and draped in standard sterile fashion.  A timeout was performed.    A 22 Nepalese Stortz cystoscope was assembled and passed through the urethra and into the bladder.  The existing left ureteral stent was noted emanating from the left ureteral orifice with no encrustation.  The stent was grasped with a stent grasper and brought out the urethral meatus where it was cannulated with a wire which was passed up to the renal pelvis under fluoroscopic guidance.  The existing stent was removed intact and discarded.  A semirigid ureteroscope was assembled and passed through the urethra, into the bladder and up the left ureter.  Gentle retrograde pyelogram showed mild left hydronephrosis.  A second wire was passed up to the renal pelvis under fluoroscopic guidance and the scope was removed leaving the wire in place.  Over the working wire a CureLauncher navigator 11/13 Nepalese by 46 cm ureteral access sheath was passed up to the proximal ureter under fluoroscopic guidance and the obturator and wire were removed.  A SightCall digital flexible ureteroscope was passed through the access sheath up to the renal pelvis and complete pyeloscopy revealed the above-noted findings.  HylioSoft holmium laser was used to dust and fragment the stones.  A 0 tip basket was used to remove large stone fragments.  Estimate 90% stone free with remainder of the stone burden consisting of dust which should pass on its own.  Repeat retrograde pyelogram was performed to provide a landing zone for stent.  Pullout ureteroscopy revealed the ureter to be in good condition with no tears or lacerations.  Ureteroscope and ureteral access sheath were removed.  A new stent was then placed in the usual fashion under cystoscopic and fluoroscopic guidance with good curls noted proximally and distally.   Bladder was drained and scope was removed.  Patient was cleaned up, awoken from anesthesia and brought to PACU in stable condition    Jaime Vernon MD   [facility]  [phone] clinic phone

## 2025-02-24 NOTE — ANESTHESIA PROCEDURE NOTES
Airway       Patient location during procedure: OR       Procedure Start/Stop Times: 2/24/2025 9:18 AM  Staff -        Anesthesiologist:  Greg Guillermo MD       CRNA: Greg Treviño APRN CRNA       Performed By: CRNA  Consent for Airway        Urgency: elective  Indications and Patient Condition       Indications for airway management: mando-procedural       Induction type:intravenous       Mask difficulty assessment: 2 - vent by mask + OA or adjuvant +/- NMBA    Final Airway Details       Final airway type: endotracheal airway       Successful airway: Oral and ETT - single  Endotracheal Airway Details        ETT size (mm): 8.0       Cuffed: yes       Successful intubation technique: video laryngoscopy       VL Blade Size: Glidescope 4       Grade View of Cords: 1       Adjucts: stylet       Position: Right       Measured from: gums/teeth       Secured at (cm): 24       Bite block used: None    Post intubation assessment        Placement verified by: capnometry, equal breath sounds and chest rise        Number of attempts at approach: 1       Number of other approaches attempted: 0       Secured with: tape       Ease of procedure: easy       Dentition: Intact and Unchanged    Medication(s) Administered   Medication Administration Time: 2/24/2025 9:18 AM

## 2025-02-24 NOTE — ANESTHESIA CARE TRANSFER NOTE
Patient: Efraín Estrada    Procedure: Procedure(s):  cystoscopy, left ureteroscopy with laser lithotripsy, left ureteroscopy with stone basketing, left retrograde pyelogram, left ureteral stent exchange       Diagnosis: Left ureteral stone [N20.1]  Diagnosis Additional Information: No value filed.    Anesthesia Type:   General     Note:    Oropharynx: oropharynx clear of all foreign objects and spontaneously breathing  Level of Consciousness: awake  Oxygen Supplementation: face mask  Level of Supplemental Oxygen (L/min / FiO2): 6  Independent Airway: airway patency satisfactory and stable  Dentition: dentition unchanged  Vital Signs Stable: post-procedure vital signs reviewed and stable  Report to RN Given: handoff report given  Patient transferred to: PACU    Handoff Report: Identifed the Patient, Identified the Reponsible Provider, Reviewed the pertinent medical history, Discussed the surgical course, Reviewed Intra-OP anesthesia mangement and issues during anesthesia, Set expectations for post-procedure period and Allowed opportunity for questions and acknowledgement of understanding  Vitals:  Vitals Value Taken Time   BP     Temp 36.4  C (97.52  F) 02/24/25 1034   Pulse 76 02/24/25 1034   Resp 13 02/24/25 1034   SpO2 97 % 02/24/25 1034   Vitals shown include unfiled device data.    Electronically Signed By: LUIS Rodriguez CRNA  February 24, 2025  10:36 AM

## 2025-02-24 NOTE — DISCHARGE INSTRUCTIONS
Today you were given 975 mg of Tylenol at 7:20am. Next dose can be taken at 1:20pm.  The recommended daily maximum dose is 4000 mg.      **Because you had anesthesia today and your history of sleep apnea, it is extremely important that you use your CPAP machine for the next 24 hours while napping or sleeping.**    Same Day Surgery Discharge Instructions for  Sedation and General Anesthesia     It's not unusual to feel dizzy, light-headed or faint for up to 24 hours after surgery or while taking pain medication.  If you have these symptoms: sit for a few minutes before standing and have someone assist you when you get up to walk or use the bathroom.    You should rest and relax for the next 24 hours. We recommend you make arrangements to have an adult stay with you for at least 24 hours after your discharge.  Avoid hazardous and strenuous activity.    DO NOT DRIVE any vehicle or operate mechanical equipment for 24 hours following the end of your surgery.  Even though you may feel normal, your reactions may be affected by the medication you have received.    Do not drink alcoholic beverages for 24 hours following surgery.     Slowly progress to your regular diet as you feel able. It's not unusual to feel nauseated and/or vomit after receiving anesthesia.  If you develop these symptoms, drink clear liquids (apple juice, ginger ale, broth, 7-up, etc. ) until you feel better.  If your nausea and vomiting persists for 24 hours, please notify your surgeon.      All narcotic pain medications, along with inactivity and anesthesia, can cause constipation. Drinking plenty of liquids and increasing fiber intake will help.    For any questions of a medical nature, call your surgeon.    Do not make important decisions for 24 hours.    If you had general anesthesia, you may have a sore throat for a couple of days related to the breathing tube used during surgery.  You may use Cepacol lozenges to help with this discomfort.  If it  worsens or if you develop a fever, contact your surgeon.     If you feel your pain is not well managed with the pain medications prescribed by your surgeon, please contact your surgeon's office to let them know so they can address your concerns.      **If you have questions or concerns about your procedure,   call Dr. Vernon [phone]**       POSTOPERATIVE INSTRUCTIONS    Diagnosis-------------------------------   Left kidney stones    Procedure-------------------------------  Procedure(s) (LRB):  cystoscopy, left ureteroscopy with laser lithotripsy, left ureteroscopy with stone basketing, left retrograde pyelogram, left ureteral stent exchange (Left)      Findings--------------------------------  Numerous left renal stones including large 8 mm stone (retropulsed UPJ stone) and additional 3-4 mm lower pole stones, laser dusted/fragmented/basketed, 90% stone free    Home-going instructions-----------------         Activity Limitation:     - No driving or operating heavy machinery while on narcotic pain medication.     FOLLOW THESE INSTRUCTIONS AS INDICATED BELOW:  - Observe operative area for signs of excessive bleeding.  - You may shower.  - Increase fluid intake to promote clear urine.  - Resume usual diet as tolerated    What to expect while recovering-----------  - You may experience some intermittent bleeding that makes your urine pink or cherry colored. This is normal.  - However, if you are unable to urinate, passing large amount of clots, have steve blood in your urine, or have a temperature >101 degrees, call the urology nurse on call, or present to your nearest emergency department.  - You are encouraged to walk daily, and have no activity restrictions.   - A URETERAL STENT has been placed that allows urine to flow unobstructed from your kidney into your bladder.  The stent has a curl in the kidney and a curl in the bladder.  The curl in the bladder can cause some urgency and frequency of urination as  well as some mild blood in the urine.  The curl in the kidney can cause some mild flank discomfort.  This may be more noticeable when you urinate.  A URETERAL STENT is meant to be left in temporarily.  It must be removed or changed no later than 3 months after its insertion.  If it's not removed it can result in stone overgrowth on the stent that can cause pain, infection, and can be very difficult to remove.      Discharge Medications/instructions:   - Flomax (tamsulosin) to be taken daily until stent is removed  - Take Tylenol 1000mg every 6 hours for pain  - Take Oxycodone 5mg every 6 hours only for break through pain  - Take Colace while taking Oxycodone to prevent constipation      Questions/concerns------------------------  [facility]: [phone]    Future appointments  Follow up as scheduled for cystoscopy and stent removal in the office      Jaime Vernon MD

## 2025-02-24 NOTE — BRIEF OP NOTE
[facility]  Operative Note    Pre-operative diagnosis: Left ureteral stone [N20.1]   Post-operative diagnosis Left renal stones   Procedure: Procedure(s):  cystoscopy, left ureteroscopy with laser lithotripsy, left ureteroscopy with stone basketing, left retrograde pyelogram, left ureteral stent exchange  Fluoroscopic interpretation <1 hour physician time   Surgeon: Jaime Vernon MD   Assistants(s): none   Anesthesia: General    Estimated blood loss: Minimal    Total IV fluids: (See anesthesia record)   Blood transfusion: No transfusion was given during surgery   Total urine output: Not measured   Drains: Left ureteral stent   Specimens: ID Type Source Tests Collected by Time Destination   A : LEFT KIDNEY STONE Calculus/Stone Kidney, Left STONE ANALYSIS Jaime Vernon MD 2/24/2025 10:10 AM         Implants: none     Findings:   Numerous left renal stones including large 8 mm stone (retropulsed UPJ stone) and additional 3-4 mm lower pole stones, laser dusted/fragmented/basketed, 90% stone free   Complications: None.   Condition: Stable

## 2025-02-24 NOTE — ANESTHESIA POSTPROCEDURE EVALUATION
Patient: Efraín Estrada    Procedure: Procedure(s):  cystoscopy, left ureteroscopy with laser lithotripsy, left ureteroscopy with stone basketing, left retrograde pyelogram, left ureteral stent exchange       Anesthesia Type:  General    Note:     Postop Pain Control: Uneventful            Sign Out: Well controlled pain   PONV: No   Neuro/Psych: Uneventful            Sign Out: Acceptable/Baseline neuro status   Airway/Respiratory: Uneventful            Sign Out: Acceptable/Baseline resp. status   CV/Hemodynamics: Uneventful            Sign Out: Acceptable CV status; No obvious hypovolemia; No obvious fluid overload   Other NRE: NONE   DID A NON-ROUTINE EVENT OCCUR? No           Last vitals:  Vitals Value Taken Time   /81 02/24/25 1100   Temp 35.8  C (96.44  F) 02/24/25 1105   Pulse 78 02/24/25 1105   Resp 15 02/24/25 1105   SpO2 94 % 02/24/25 1105   Vitals shown include unfiled device data.    Electronically Signed By: Greg Guillermo MD  February 24, 2025  4:03 PM

## 2025-02-24 NOTE — OR NURSING
Meets criteria for discharge.  Discharge instructions reviewed with pt and pt's designated responsible party.  Pt label on prescription bag from pharmacy matched to pt's wristband. Pharmacy bag opened with 5 prescriptions inside. Medications were reviewed to match pt wristband while pt and significant other agreed with identification. Prescriptions placed back in pharmacy bag resealed with tape and placed in pt's belongings bag per pt request.

## 2025-02-26 ENCOUNTER — OFFICE VISIT (OUTPATIENT)
Dept: INTERNAL MEDICINE | Facility: CLINIC | Age: 60
End: 2025-02-26
Payer: COMMERCIAL

## 2025-02-26 VITALS
BODY MASS INDEX: 42.66 KG/M2 | TEMPERATURE: 97.3 F | WEIGHT: 315 LBS | OXYGEN SATURATION: 98 % | HEART RATE: 83 BPM | DIASTOLIC BLOOD PRESSURE: 80 MMHG | HEIGHT: 72 IN | SYSTOLIC BLOOD PRESSURE: 122 MMHG | RESPIRATION RATE: 16 BRPM

## 2025-02-26 DIAGNOSIS — N18.31 STAGE 3A CHRONIC KIDNEY DISEASE (H): ICD-10-CM

## 2025-02-26 DIAGNOSIS — S30.1XXD ABDOMINAL WALL SEROMA, SUBSEQUENT ENCOUNTER: ICD-10-CM

## 2025-02-26 DIAGNOSIS — E66.01 MORBID OBESITY (H): ICD-10-CM

## 2025-02-26 DIAGNOSIS — N13.2 HYDRONEPHROSIS WITH URINARY OBSTRUCTION DUE TO URETERAL CALCULUS: ICD-10-CM

## 2025-02-26 LAB
APPEARANCE STONE: NORMAL
COMPN STONE: NORMAL
SPECIMEN WT: 18 MG

## 2025-02-26 PROCEDURE — 99214 OFFICE O/P EST MOD 30 MIN: CPT | Performed by: INTERNAL MEDICINE

## 2025-02-26 PROCEDURE — 3079F DIAST BP 80-89 MM HG: CPT | Performed by: INTERNAL MEDICINE

## 2025-02-26 PROCEDURE — 1111F DSCHRG MED/CURRENT MED MERGE: CPT | Performed by: INTERNAL MEDICINE

## 2025-02-26 PROCEDURE — 3074F SYST BP LT 130 MM HG: CPT | Performed by: INTERNAL MEDICINE

## 2025-02-26 NOTE — PROGRESS NOTES
ASSESSMENT:   1. Hydronephrosis with urinary obstruction due to ureteral calculus  Secondary to previous stone, now removed.  Patient will follow-up with urology 3/3/2025 to have stent removed.  Future kidney imaging per urology discretion.  Await stone analysis by urology    2. Morbid obesity (H)  Weight down 7 pounds.  Counseled regarding calorie/carbohydrate reduction.  Walking exercise as able.  Not candidate for GLP-1 agonist for weight loss with current insurance    3. Stage 3a chronic kidney disease (H)  Secondary to #1 now s/p stone removal. Repeat renal function lab 1 month  - Basic metabolic panel; Future    4. Abdominal wall seroma, subsequent encounter  Secondary to previous hernia surgery.  Getting smaller per patient.  Recent CT results reviewed.  Nontender and no increased warmth.  Continued resolution expected by general surgeon per patient report      PLAN:  Continue current medications  Call  [phone] or use [employer] to schedule a future lab appointment  non-fasting in 1 month to recheck kidney function   Follow-up with Urology 3/3/25 to have left ureter stent removed  Await stone analysis results from Urology re: future stone prevention options  Continue diet and exercise  .            Travis Kenny is a 59 year old, presenting for the following health issues:  Hospital F/U    HPI         2/17/2025   Post Discharge Outreach   How are you doing now that you are home? Doing well, stable. Urinating is more frequent and uncomfortable, but expected.   How are your symptoms? (Red Flag symptoms escalate to triage hotline per guidelines) Improved   Does the patient have their discharge instructions?  Yes   Does the patient have questions regarding their discharge instructions?  No   Were you started on any new medications or were there changes to any of your previous medications?  No   Does the patient have all of their medications? Yes   Do you have questions regarding any of your  medications?  No   Do you have all of your needed medical supplies or equipment (DME)?  (i.e. oxygen tank, CPAP, cane, etc.) Yes   Discharge Follow Up Appointment Date 2/26/2025   Discharge Follow Up Appointment Scheduled with? Primary Care Provider       Hospital Follow-up Visit:    Hospital/Nursing Home/IP Rehab Facility: [facility]  Date of Admission: 2/12/25  Date of Discharge: 2/13/25  Reason(s) for Admission: Renal Colic and acute kidney injury due to obstructing kidney stone  Was the patient in the ICU or did the patient experience delirium during hospitalization?  No  Do you have any other stressors you would like to discuss with your provider? No    Problems taking medications regularly:  None  Medication changes since discharge: None  Problems adhering to non-medication therapy:  None    Summary of hospitalization:  [facility] discharge summary reviewed  Diagnostic Tests/Treatments reviewed.  Follow up needed: REMOVAL OF KIDNEY STONE  Other Healthcare Providers Involved in Patient s Care:         Urology  Update since discharge: improved.    Plan of care communicated with patient       Discharge summary reviewed. Part of the summary as below:      [facility]  Hospitalist Discharge Summary       Date of Admission:  2/12/2025  Date of Discharge:  2/13/2025  Discharging Provider: Shauna Ronquillo PA-C  Discharge Service: Hospitalist Service        Discharge Diagnoses  Renal colic  L obstructing ureteral stone with hydronephrosis  Bilateral nephrolithiasis   KENJI        Clinically Significant Risk Factors     # Severe Obesity: Estimated body mass index is 44.23 kg/m  as calculated from the following:    Height as of this encounter: 1.829 m (6').    Weight as of this encounter: 147.9 kg (326 lb 1.6 oz).             Follow-ups Needed After Discharge  Follow-up Appointments         Follow-up and recommended labs and tests        Follow up with  primary care provider, Carlos Alberto Hunter, within 7 days for hospital follow- up.  The following labs/tests are recommended: BMP.   Recheck kidney function  Your kidney function improved but did not return to baseline but we anticipate this will happen with more time. Avoid NSAIDs.   Urology will reach out to set up follow up           On 2/24/25, pt underwent removal of left stones as below    [facility]  Operative Note     Pre-operative diagnosis: Left ureteral stone [N20.1]   Post-operative diagnosis Left renal stones   Procedure: Procedure(s):  cystoscopy, left ureteroscopy with laser lithotripsy, left ureteroscopy with stone basketing, left retrograde pyelogram, left ureteral stent exchange  Fluoroscopic interpretation <1 hour physician time   Surgeon: Jaime Vernon MD   Assistants(s): none   Anesthesia: General               Estimated blood loss: Minimal               Total IV fluids: (See anesthesia record)   Blood transfusion: No transfusion was given during surgery   Total urine output: Not measured   Drains: Left ureteral stent   Specimens: ID Type Source Tests Collected by Time Destination   A : LEFT KIDNEY STONE Calculus/Stone Kidney, Left STONE ANALYSIS Jaime Vernon MD 2/24/2025 10:10 AM            Implants: none      Findings:    Numerous left renal stones including large 8 mm stone (retropulsed UPJ stone) and additional 3-4 mm lower pole stones, laser dusted/fragmented/basketed, 90% stone free   Complications: None.   Condition: Stable          Has follow-up with Urology 3/3/25 to have left stent removed   as an outpatient  at the surgical center   No hematuria, F/C.  Rare  pain with urination since and none with last urination   Stone analysis still pending  Has stone right side that is non obstructing  Denies chest pain, shortness of breath, current abdominal pain, headache, vision changes or side effects with medications.   Has post-op seroma abdomen from prior hernia  surgery noted on CT abd during  last hospital stay. Pt states has been told by his general surgeon that is expected and should resolve. Getting smaller per pt.         Additional ROS:   Constitutional, HEENT, Cardiovascular, Pulmonary, GI and , Neuro, MSK and Psych review of systems/symptoms are otherwise negative or unchanged from previous, except as noted above.      OBJECTIVE:  /80   Pulse 83   Temp 97.3  F (36.3  C) (Temporal)   Resp 16   Ht 1.829 m (6')   Wt (!) 146 kg (321 lb 12.8 oz)   SpO2 98%   BMI 43.64 kg/m     Estimated body mass index is 43.64 kg/m  as calculated from the following:    Height as of this encounter: 1.829 m (6').    Weight as of this encounter: 146 kg (321 lb 12.8 oz).     Pulm: Lungs clear to auscultation   CV: Regular rates and rhythm  GI: Soft, obese, nontender, Normal active bowel sounds, No hepatosplenomegaly or masses palpable. Mild bulge above the umbilicus c/w known seroma. No increased warmth  Ext: Peripheral pulses intact. Minimal BLE ankle edema.  Neuro: Normal strength and tone, sensory exam grossly normal      MED REC REQUIRED  Post Medication Reconciliation Status:  Discharge medications reconciled, continue medications without change       (Chart documentation was completed, in part, with [employer] voice-recognition software. Even though reviewed, some grammatical, spelling, and word errors may remain.)    Carlos Alberto Hunter MD  Internal Medicine Department  [facility]

## 2025-02-26 NOTE — PATIENT INSTRUCTIONS
Continue current medications  Call  [phone] or use [employer] to schedule a future lab appointment  non-fasting in 1 month to recheck kidney function   Follow-up with Urology 3/3/25 to have left ureter stent removed  Await stone analysis results from Urology re: future stone prevention options  Continue diet and exercise  .

## 2025-03-03 ENCOUNTER — OFFICE VISIT (OUTPATIENT)
Dept: UROLOGY | Facility: CLINIC | Age: 60
End: 2025-03-03
Payer: COMMERCIAL

## 2025-03-03 VITALS
HEIGHT: 72 IN | WEIGHT: 315 LBS | BODY MASS INDEX: 42.66 KG/M2 | HEART RATE: 86 BPM | OXYGEN SATURATION: 97 % | DIASTOLIC BLOOD PRESSURE: 83 MMHG | SYSTOLIC BLOOD PRESSURE: 123 MMHG

## 2025-03-03 DIAGNOSIS — N20.1 LEFT URETERAL STONE: ICD-10-CM

## 2025-03-03 DIAGNOSIS — N20.0 URIC ACID KIDNEY STONE: Primary | ICD-10-CM

## 2025-03-03 PROCEDURE — 3079F DIAST BP 80-89 MM HG: CPT | Performed by: STUDENT IN AN ORGANIZED HEALTH CARE EDUCATION/TRAINING PROGRAM

## 2025-03-03 PROCEDURE — 99213 OFFICE O/P EST LOW 20 MIN: CPT | Mod: 25 | Performed by: STUDENT IN AN ORGANIZED HEALTH CARE EDUCATION/TRAINING PROGRAM

## 2025-03-03 PROCEDURE — 3074F SYST BP LT 130 MM HG: CPT | Performed by: STUDENT IN AN ORGANIZED HEALTH CARE EDUCATION/TRAINING PROGRAM

## 2025-03-03 PROCEDURE — 52310 CYSTOSCOPY AND TREATMENT: CPT | Performed by: STUDENT IN AN ORGANIZED HEALTH CARE EDUCATION/TRAINING PROGRAM

## 2025-03-03 RX ORDER — POTASSIUM CITRATE 1080 MG/1
10 TABLET, EXTENDED RELEASE ORAL
Qty: 180 TABLET | Refills: 5 | Status: SHIPPED | OUTPATIENT
Start: 2025-03-03

## 2025-03-03 RX ORDER — LIDOCAINE HYDROCHLORIDE 20 MG/ML
JELLY TOPICAL ONCE
Status: COMPLETED | OUTPATIENT
Start: 2025-03-03 | End: 2025-03-03

## 2025-03-03 RX ADMIN — LIDOCAINE HYDROCHLORIDE 5 ML: 20 JELLY TOPICAL at 13:00

## 2025-03-03 NOTE — LETTER
3/3/2025       RE: Efraín Estrada  [address]  [facility] [address]     Dear Colleague,    Thank you for referring your patient, Efraín Estrada, to the [facility] [place] at [facility]. Please see a copy of my visit note below.    CHIEF COMPLAINT   Efraín Estrada who is a 59 year old male returns today for follow-up of left nephrolithiasis s/p left URS 2/24/2025, right nephrolithiasis.      HPI   Efraín Estrada is a 59 year old male returns today for follow-up of left nephrolithiasis s/p left URS 2/24/2025, right nephrolithiasis.      Doing ok with stent in place    PHYSICAL EXAM  Patient is a 59 year old  male   Vitals: Blood pressure 123/83, pulse 86, height 1.829 m (6'), weight (!) 145.2 kg (320 lb), SpO2 97%.  Body mass index is 43.4 kg/m .  General Appearance Adult:   Alert, no acute distress, oriented  HENT: throat/mouth:normal, good dentition  Lungs: no respiratory distress, or pursed lip breathing  Heart: No obvious jugular venous distension present  Abdomen: nondistended  Musculoskeltal: extremities normal, no peripheral edema  Skin: no suspicious lesions or rashes  Neuro: Alert, oriented, speech and mentation normal  Psych: affect and mood normal    Stone analysis 2/24/2025  Comment: Calculi composed primarily of uric acid.     All pertinent imaging reviewed:      PRE-PROCEDURE DIAGNOSIS: left renal stone    POST-PROCEDURE DIAGNOSIS: left renal stone    PROCEDURE: Cystoscopy with left ureteral stent removal      DESCRIPTION OF PROCEDURE: After informed consent was obtained, the patient was brought to the procedure room where he was placed in the supine position with all pressure points well padded.  The penis was prepped and draped in sterile fashion. A flexible cystoscope was introduced through a well-lubricated urethra. The stent was visualized, grasped with a flexible grasper and removed intact and  discarded    The flexible cystoscope was removed and the findings were described to the patient.     ASSESSMENT and PLAN  59 year old male returns today for follow-up of left nephrolithiasis s/p left URS 2/24/2025, right nephrolithiasis.      I discussed uric acid stone prevention. He used to eat a lot of hamburger meat but he is now eating more chicken. I discussed the need to reduce animal protein.    He still has a large nonobstructive right renal stone. Will trial dissolution therapy with potassium citrate.    Return 3 months with litholink x2, CT abd/pelvis w/o contrast, with me        Jaime Vernon MD   [facility]  [facility] Phone: [phone]      Again, thank you for allowing me to participate in the care of your patient.      Sincerely,    Jaime Vernon MD

## 2025-03-03 NOTE — NURSING NOTE
Chief Complaint   Patient presents with    Left Ureteral Stone     Patient here today Cystoscopy Stent removal          Prior to the start of the procedure and with procedural staff participation, I verbally confirmed the patient s identity using two indicators, relevant allergies, that the procedure was appropriate and matched the consent or emergent situation, and that the correct equipment/implants were available. Immediately prior to starting the procedure I conducted the Time Out with the procedural staff and re-confirmed the patient s name, procedure, and site/side. I have wiped the patient off with the povidone-Iodine solution, draped them,  used Lidocaine hydrochloride jelly, and instilled sterile water into the bladder. (The Joint Commission universal protocol was followed.)  Yes    Sedation (Moderate or Deep): None      5mL 2% lidocaine hydrochloride Urojet instilled into urethra.    NDC# [address]-0  Lot #: [id number]  Expiration Date:  09/26        SILVER Parrish

## 2025-03-03 NOTE — PROGRESS NOTES
CHIEF COMPLAINT   Efraín Estrada who is a 59 year old male returns today for follow-up of left nephrolithiasis s/p left URS 2/24/2025, right nephrolithiasis.      HPI   Efraín Estrada is a 59 year old male returns today for follow-up of left nephrolithiasis s/p left URS 2/24/2025, right nephrolithiasis.      Doing ok with stent in place    PHYSICAL EXAM  Patient is a 59 year old  male   Vitals: Blood pressure 123/83, pulse 86, height 1.829 m (6'), weight (!) 145.2 kg (320 lb), SpO2 97%.  Body mass index is 43.4 kg/m .  General Appearance Adult:   Alert, no acute distress, oriented  HENT: throat/mouth:normal, good dentition  Lungs: no respiratory distress, or pursed lip breathing  Heart: No obvious jugular venous distension present  Abdomen: nondistended  Musculoskeltal: extremities normal, no peripheral edema  Skin: no suspicious lesions or rashes  Neuro: Alert, oriented, speech and mentation normal  Psych: affect and mood normal    Stone analysis 2/24/2025  Comment: Calculi composed primarily of uric acid.     All pertinent imaging reviewed:      PRE-PROCEDURE DIAGNOSIS: left renal stone    POST-PROCEDURE DIAGNOSIS: left renal stone    PROCEDURE: Cystoscopy with left ureteral stent removal      DESCRIPTION OF PROCEDURE: After informed consent was obtained, the patient was brought to the procedure room where he was placed in the supine position with all pressure points well padded.  The penis was prepped and draped in sterile fashion. A flexible cystoscope was introduced through a well-lubricated urethra. The stent was visualized, grasped with a flexible grasper and removed intact and discarded    The flexible cystoscope was removed and the findings were described to the patient.     ASSESSMENT and PLAN  59 year old male returns today for follow-up of left nephrolithiasis s/p left URS 2/24/2025, right nephrolithiasis.      I discussed uric acid stone prevention. He used to eat a lot of hamburger meat but  he is now eating more chicken. I discussed the need to reduce animal protein.    He still has a large nonobstructive right renal stone. Will trial dissolution therapy with potassium citrate.    Return 3 months with litholink x2, CT abd/pelvis w/o contrast, with me        Jaime Vernon MD   [facility]  [facility] Phone: [phone]

## 2025-03-03 NOTE — PATIENT INSTRUCTIONS
"             AFTER YOUR CYSTOSCOPY STENT REMOVAL  ?  ?  You have just completed a cystoscopy, or \"cysto\", which allowed your physician to learn more about your bladder (or to remove a stent placed after surgery). We suggest that you continue to avoid caffeine, fruit juice, and alcohol for the next 24 hours, however, you are encouraged to return to your normal activities.  ?  ?  A few things that are considered normal after your cystoscopy:  ?  * small amount of bleeding (or spotting) that clears within the next 24 hours  ?  * slight burning sensation with urination  ?  * sensation of needing to void (urinate) more frequently  ?  * the feeling of \"air\" in your urine  ?  * mild discomfort that is relieved with Tylenol    * bladder spasms  ?  ?  ?  Please contact our office promptly if you:  ?  * develop a fever above 101 degrees  ?  * are unable to urinate  ?  * develop bright red blood that does not stop  ?  * experience severe pain or swelling  ?  ?  ?  And of course, please contact our office with any concerns or questions [phone].  ?    "

## 2025-05-30 ENCOUNTER — ANCILLARY PROCEDURE (OUTPATIENT)
Dept: CT IMAGING | Facility: CLINIC | Age: 60
End: 2025-05-30
Attending: STUDENT IN AN ORGANIZED HEALTH CARE EDUCATION/TRAINING PROGRAM
Payer: COMMERCIAL

## 2025-05-30 DIAGNOSIS — N20.0 URIC ACID KIDNEY STONE: ICD-10-CM

## 2025-05-30 PROCEDURE — 74176 CT ABD & PELVIS W/O CONTRAST: CPT

## 2025-06-02 ENCOUNTER — OFFICE VISIT (OUTPATIENT)
Dept: UROLOGY | Facility: CLINIC | Age: 60
End: 2025-06-02
Payer: COMMERCIAL

## 2025-06-02 VITALS
OXYGEN SATURATION: 97 % | DIASTOLIC BLOOD PRESSURE: 91 MMHG | BODY MASS INDEX: 42.66 KG/M2 | SYSTOLIC BLOOD PRESSURE: 138 MMHG | HEART RATE: 82 BPM | WEIGHT: 315 LBS | HEIGHT: 72 IN

## 2025-06-02 DIAGNOSIS — N20.0 URIC ACID KIDNEY STONE: Primary | ICD-10-CM

## 2025-06-02 DIAGNOSIS — R82.991 HYPOCITRATURIA: ICD-10-CM

## 2025-06-02 PROCEDURE — 3080F DIAST BP >= 90 MM HG: CPT | Performed by: STUDENT IN AN ORGANIZED HEALTH CARE EDUCATION/TRAINING PROGRAM

## 2025-06-02 PROCEDURE — 99214 OFFICE O/P EST MOD 30 MIN: CPT | Performed by: STUDENT IN AN ORGANIZED HEALTH CARE EDUCATION/TRAINING PROGRAM

## 2025-06-02 PROCEDURE — 1126F AMNT PAIN NOTED NONE PRSNT: CPT | Performed by: STUDENT IN AN ORGANIZED HEALTH CARE EDUCATION/TRAINING PROGRAM

## 2025-06-02 PROCEDURE — 3075F SYST BP GE 130 - 139MM HG: CPT | Performed by: STUDENT IN AN ORGANIZED HEALTH CARE EDUCATION/TRAINING PROGRAM

## 2025-06-02 RX ORDER — POTASSIUM CITRATE 1080 MG/1
20 TABLET, EXTENDED RELEASE ORAL
Qty: 360 TABLET | Refills: 5 | Status: SHIPPED | OUTPATIENT
Start: 2025-06-02

## 2025-06-02 ASSESSMENT — PAIN SCALES - GENERAL: PAINLEVEL_OUTOF10: NO PAIN (0)

## 2025-06-02 NOTE — NURSING NOTE
Chief Complaint   Patient presents with    hx kidey stone     Ct abd -pelvis done 5- and litho link     Doing well overall   Peter Holt, CMA

## 2025-06-02 NOTE — PROGRESS NOTES
CHIEF COMPLAINT   Efraín Estrada who is a 59 year old male returns today for follow-up of left nephrolithiasis s/p URS 2/24/2025, right nephrolithiasis    HPI   Efraín Estrada is a 59 year old male returns today for follow-up of left nephrolithiasis s/p URS 2/24/2025, right nephrolithiasis    He started potassium citrate and then a week later had litholink x2    Denies any flank pain    He did try to reduce his protein load      PHYSICAL EXAM  Patient is a 59 year old  male   Vitals: Blood pressure (!) 138/91, pulse 82, height 1.829 m (6'), weight (!) 147 kg (324 lb), SpO2 97%.  Body mass index is 43.94 kg/m .  General Appearance Adult:   Alert, no acute distress, oriented  HENT: throat/mouth:normal, good dentition  Lungs: no respiratory distress, or pursed lip breathing  Heart: No obvious jugular venous distension present  Abdomen: nondistended  Musculoskeltal: extremities normal, no peripheral edema  Skin: no suspicious lesions or rashes  Neuro: Alert, oriented, speech and mentation normal  Psych: affect and mood normal  Gait: Normal  : deferred    All pertinent imaging reviewed:    Ct 5/30/2025    IMPRESSION:   1.  Resolution of left ureteral and renal stones.     2.  Unchanged 10 mm right lower pole renal stone. New 1 mm right mid pole stone.     3.  Mildly enlarged prostate.     4.  Unchanged periumbilical subcutaneous fluid collection, likely a seroma.          Litholnk x2 reviewed, 3/13/ and 3/14/2025  Profoundly low hypocitraturia and low urine ph despite potassium citrate    ASSESSMENT and PLAN  59 year old male returns today for follow-up of left nephrolithiasis s/p URS 2/24/2025, right nephrolithiasis    He actually has a small amount of progression of stone burden despite attempt at dissolution therapy (new punctate right sided stone). His urine has low ph despite the potassium citrate    Patient wants to continue with attempt at dissolution therapy    - increase k citrate to 20 meq tid with  meals (rx drug management)    - return 6 months with CT abd/pelvis w/o contrast, litholink x1 prior      Jaime Vernon MD   [facility]  [facility] Phone: [phone]

## 2025-06-02 NOTE — LETTER
6/2/2025       RE: Efraín Estrada  [address]  [facility] [address]     Dear Colleague,    Thank you for referring your patient, Efraín Estrada, to the [facility] [place] at [facility]. Please see a copy of my visit note below.    CHIEF COMPLAINT   Efraín Estrdaa who is a 59 year old male returns today for follow-up of left nephrolithiasis s/p URS 2/24/2025, right nephrolithiasis    HPI   Efraín Estrada is a 59 year old male returns today for follow-up of left nephrolithiasis s/p URS 2/24/2025, right nephrolithiasis    He started potassium citrate and then a week later had litholink x2    Denies any flank pain    He did try to reduce his protein load      PHYSICAL EXAM  Patient is a 59 year old  male   Vitals: Blood pressure (!) 138/91, pulse 82, height 1.829 m (6'), weight (!) 147 kg (324 lb), SpO2 97%.  Body mass index is 43.94 kg/m .  General Appearance Adult:   Alert, no acute distress, oriented  HENT: throat/mouth:normal, good dentition  Lungs: no respiratory distress, or pursed lip breathing  Heart: No obvious jugular venous distension present  Abdomen: nondistended  Musculoskeltal: extremities normal, no peripheral edema  Skin: no suspicious lesions or rashes  Neuro: Alert, oriented, speech and mentation normal  Psych: affect and mood normal  Gait: Normal  : deferred    All pertinent imaging reviewed:    Ct 5/30/2025    IMPRESSION:   1.  Resolution of left ureteral and renal stones.     2.  Unchanged 10 mm right lower pole renal stone. New 1 mm right mid pole stone.     3.  Mildly enlarged prostate.     4.  Unchanged periumbilical subcutaneous fluid collection, likely a seroma.          Litholnk x2 reviewed, 3/13/ and 3/14/2025  Profoundly low hypocitraturia and low urine ph despite potassium citrate    ASSESSMENT and PLAN  59 year old male returns today for follow-up of left nephrolithiasis s/p URS 2/24/2025,  right nephrolithiasis    He actually has a small amount of progression of stone burden despite attempt at dissolution therapy (new punctate right sided stone). His urine has low ph despite the potassium citrate    Patient wants to continue with attempt at dissolution therapy    - increase k citrate to 20 meq tid with meals (rx drug management)    - return 6 months with CT abd/pelvis w/o contrast, litholink x1 prior      Jaime Vernon MD   [facility]  [facility] Phone: [phone]      Again, thank you for allowing me to participate in the care of your patient.      Sincerely,    Jaime Vernon MD

## 2025-06-11 ENCOUNTER — RESULTS FOLLOW-UP (OUTPATIENT)
Dept: SURGERY | Facility: CLINIC | Age: 60
End: 2025-06-11

## 2025-07-02 ENCOUNTER — TELEPHONE (OUTPATIENT)
Dept: UROLOGY | Facility: CLINIC | Age: 60
End: 2025-07-02
Payer: COMMERCIAL

## 2025-07-02 NOTE — TELEPHONE ENCOUNTER
----- Message from Yokasta THOMPSON sent at 2025 10:41 AM CDT -----  Regardin month follow up  Return in about 6 months (around 2025) with CT abd/pelvis w/o contrast, fifi x1 prior    HANNAH  25

## 2025-07-09 ENCOUNTER — ORDERS ONLY (AUTO-RELEASED) (OUTPATIENT)
Facility: CLINIC | Age: 60
End: 2025-07-09
Payer: COMMERCIAL

## 2025-07-09 DIAGNOSIS — N20.0 KIDNEY STONE: ICD-10-CM

## 2025-07-09 DIAGNOSIS — N20.0 KIDNEY STONE: Primary | ICD-10-CM

## 2025-07-30 LAB
AMMONIA 24H UR-SRATE: 19 MMOL/24 HR
CA H2 PHOS DIHYD 24H SATFR UR: 0.1
CALCIUM 24H UR-MRATE: 20 MG/24 HR
CALCIUM/CREAT 24H UR: 8 MG/G CREAT
CAOX INDEX 24H UR-RTO: 1.89
CHLORIDE 24H UR-SRATE: 139 MMOL/24 HR
CITRATE 24H UR-MRATE: 353 MG/24 HR
CREAT 24H UR-MRATE: 2347 MG/24 HR
CREAT/BW 24H UR-RELMRAT: 16 MG/24 HR/KG
CYSTINE 24H UR QL: ABNORMAL
Lab: 0.1 MG/24 HR/KG
Lab: 0.6 G/KG/24 HR
Lab: ABNORMAL
MAGNESIUM 24H UR-MRATE: 123 MG/24 HR
OXALATE 24H UR-MRATE: 51 MG/24 HR
PH 24H UR: 5.61 [PH]
PHOSPHATE 24H UR-MRATE: 972 MG/24 HR
POTASSIUM 24H UR-SRATE: 79 MMOL/24 HR
SODIUM 24H UR-SRATE: 135 MMOL/24 HR
SPECIMEN VOL 24H UR: 1080 ML/24 HR
SULFATE 24H UR-SRATE: 44 MEQ/24 HR
URATE 24H SATFR UR: 2.38
URATE 24H UR-MRATE: 668 MG/24 HR
UUN 24H UR-MRATE: 10.17 G/24 HR

## (undated) DEVICE — DAVINCI XI DRAPE ARM 470015

## (undated) DEVICE — DRSG TEGADERM 4X4 3/4" 1626

## (undated) DEVICE — SOL NACL 0.9% IRRIG 1000ML BOTTLE 2F7124

## (undated) DEVICE — PAD CHUX UNDERPAD 23X24" 7136

## (undated) DEVICE — SU STRATAFIX PDS PLUS 2-0 SPIRAL SH 23CM SXPP1B433

## (undated) DEVICE — DAVINCI XI OBTURATOR BLADELESS 8MM 470359

## (undated) DEVICE — LINEN HALF SHEET 5512

## (undated) DEVICE — BNDG ABDOMINAL BINDER 9X62-84" 79-89210

## (undated) DEVICE — TUBING IRRIG TUR Y TYPE 96" LF 6543-01

## (undated) DEVICE — CONTRAST ISOVUE 300 61% IOPAMIDOL 10X30ML VIAL 131525

## (undated) DEVICE — CATH URETERAL FLEX TIP TIGERTAIL 06FRX70CM 139006

## (undated) DEVICE — PACK LAP CHOLE SLC15LCFSD

## (undated) DEVICE — GLOVE BIOGEL PI SZ 8.0 40880

## (undated) DEVICE — LASER FIBER HOLMIUM MOSES 200 D/F/L AC-10030100

## (undated) DEVICE — BLADE CLIPPER 4406

## (undated) DEVICE — DRAPE SHEET REV FOLD 3/4 9349

## (undated) DEVICE — SU VICRYL 4-0 PS-2 18" UND J496H

## (undated) DEVICE — CATH FOLEY COUNCIL 18FR 5ML LATEX 0196SI18

## (undated) DEVICE — ENDO ADAPTER CHECK-FLO DISP 27550-CKU

## (undated) DEVICE — SOL WATER IRRIG 1000ML BOTTLE 2F7114

## (undated) DEVICE — DRAPE GYN/UROLOGY FLUID POUCH TUR 29455

## (undated) DEVICE — BAG DRAIN URO FOR SIEMENS 8MM ADAPTER NS CC164NS-A

## (undated) DEVICE — DAVINCI XI NDL DRIVER LARGE 470006

## (undated) DEVICE — GLOVE BIOGEL PI MICRO SZ 7.0 48570

## (undated) DEVICE — CLEANER INST PRE-KLENZ SOAK SHIELD TUBE 6 ML MEDIUM 2D66J4

## (undated) DEVICE — RAD RX ISOVUE 300 (50ML) 61% IOPAMIDOL CHARGE PER ML

## (undated) DEVICE — LIGHT HANDLE X2

## (undated) DEVICE — SOL NACL 0.9% IRRIG 3000ML BAG 2B7477

## (undated) DEVICE — LINEN FULL SHEET 5511

## (undated) DEVICE — PACK CYSTO CUSTOM RIDGES

## (undated) DEVICE — ANTIFOG SOLUTION SEE SHARP 150M TROCAR SWABS 30978 (COI)

## (undated) DEVICE — SHEATH URETERAL ACCESS NAVIGATOR 11/13FRX46CM M0062502040

## (undated) DEVICE — PREP CHLORAPREP 26ML TINTED HI-LITE ORANGE 930815

## (undated) DEVICE — BASKET STONE RETRIEVAL NTNL ZERO TIP 1.9FRX90CM M0063901050

## (undated) DEVICE — CATH URETERAL DUAL LUMEN 10FRX54CM M0064051000

## (undated) DEVICE — GOWN IMPERVIOUS SPECIALTY XLG/XLONG 32474

## (undated) DEVICE — PACK TUR CUSTOM SBA15RUFSE

## (undated) DEVICE — SU STRATAFIX PDS PLUS CT-1 30CM SXPP1A435

## (undated) DEVICE — DRAPE BREAST/CHEST 29420

## (undated) DEVICE — GLOVE BIOGEL PI MICRO INDICATOR UNDERGLOVE SZ 7.0 48970

## (undated) DEVICE — SOL NACL 0.9% INJ 1000ML BAG 2B1324X

## (undated) DEVICE — DAVINCI XI DRAPE COLUMN 470341

## (undated) DEVICE — BAG URINARY DRAIN 4000ML LF 153509

## (undated) DEVICE — SU STRATAFIX PDS PLUS 2-0 SPIRAL SH 30CM SXPP1B416

## (undated) DEVICE — SU VICRYL 3-0 SH 27" J316H

## (undated) DEVICE — DAVINCI XI SEAL UNIVERSAL 5-12MM 470500

## (undated) DEVICE — PAD CHUX UNDERPAD 30X36" P3036C

## (undated) DEVICE — PREP DYNA-HEX 4% CHG SCRUB 4OZ BOTTLE MDS098710

## (undated) DEVICE — SYR 20ML LL W/O NDL 302830

## (undated) DEVICE — GUIDEWIRE SENSOR DUAL FLEX STR 0.035"X150CM M0066703080

## (undated) DEVICE — LINEN TOWEL PACK X5 5464

## (undated) DEVICE — GUIDEWIRE URO STR STIFF .035"X150CM NITINOL 150NSS35

## (undated) DEVICE — BAG CLEAR TRASH 1.3M 39X33" P4040C

## (undated) DEVICE — ENDO TROCAR FIRST ENTRY KII FIOS Z-THRD 05X100MM CTF03

## (undated) DEVICE — DAVINCI HOT SHEARS TIP COVER  400180

## (undated) DEVICE — ESU GROUND PAD UNIVERSAL W/O CORD

## (undated) RX ORDER — ONDANSETRON 2 MG/ML
INJECTION INTRAMUSCULAR; INTRAVENOUS
Status: DISPENSED
Start: 2024-11-12

## (undated) RX ORDER — HYDROCODONE BITARTRATE AND ACETAMINOPHEN 5; 325 MG/1; MG/1
TABLET ORAL
Status: DISPENSED
Start: 2024-11-12

## (undated) RX ORDER — HYDROMORPHONE HCL IN WATER/PF 6 MG/30 ML
PATIENT CONTROLLED ANALGESIA SYRINGE INTRAVENOUS
Status: DISPENSED
Start: 2024-11-12

## (undated) RX ORDER — CEFAZOLIN SODIUM/WATER 3 G/30 ML
SYRINGE (ML) INTRAVENOUS
Status: DISPENSED
Start: 2025-02-12

## (undated) RX ORDER — FENTANYL CITRATE 50 UG/ML
INJECTION, SOLUTION INTRAMUSCULAR; INTRAVENOUS
Status: DISPENSED
Start: 2024-11-12

## (undated) RX ORDER — ONDANSETRON 2 MG/ML
INJECTION INTRAMUSCULAR; INTRAVENOUS
Status: DISPENSED
Start: 2025-02-12

## (undated) RX ORDER — FENTANYL CITRATE 0.05 MG/ML
INJECTION, SOLUTION INTRAMUSCULAR; INTRAVENOUS
Status: DISPENSED
Start: 2024-11-12

## (undated) RX ORDER — GLYCOPYRROLATE 0.2 MG/ML
INJECTION, SOLUTION INTRAMUSCULAR; INTRAVENOUS
Status: DISPENSED
Start: 2025-02-12

## (undated) RX ORDER — GLYCOPYRROLATE 0.2 MG/ML
INJECTION, SOLUTION INTRAMUSCULAR; INTRAVENOUS
Status: DISPENSED
Start: 2024-11-12

## (undated) RX ORDER — DEXAMETHASONE SODIUM PHOSPHATE 4 MG/ML
INJECTION, SOLUTION INTRA-ARTICULAR; INTRALESIONAL; INTRAMUSCULAR; INTRAVENOUS; SOFT TISSUE
Status: DISPENSED
Start: 2024-11-12

## (undated) RX ORDER — DEXAMETHASONE SODIUM PHOSPHATE 4 MG/ML
INJECTION, SOLUTION INTRA-ARTICULAR; INTRALESIONAL; INTRAMUSCULAR; INTRAVENOUS; SOFT TISSUE
Status: DISPENSED
Start: 2025-02-12

## (undated) RX ORDER — CEFAZOLIN SODIUM/WATER 3 G/30 ML
SYRINGE (ML) INTRAVENOUS
Status: DISPENSED
Start: 2024-11-12

## (undated) RX ORDER — HYDROMORPHONE HYDROCHLORIDE 1 MG/ML
INJECTION, SOLUTION INTRAMUSCULAR; INTRAVENOUS; SUBCUTANEOUS
Status: DISPENSED
Start: 2024-11-12

## (undated) RX ORDER — LIDOCAINE HYDROCHLORIDE 10 MG/ML
INJECTION, SOLUTION EPIDURAL; INFILTRATION; INTRACAUDAL; PERINEURAL
Status: DISPENSED
Start: 2025-02-12

## (undated) RX ORDER — HYDROCODONE BITARTRATE AND ACETAMINOPHEN 10; 325 MG/1; MG/1
TABLET ORAL
Status: DISPENSED
Start: 2024-11-12

## (undated) RX ORDER — PROPOFOL 10 MG/ML
INJECTION, EMULSION INTRAVENOUS
Status: DISPENSED
Start: 2024-11-12

## (undated) RX ORDER — CEFAZOLIN SODIUM/WATER 3 G/30 ML
SYRINGE (ML) INTRAVENOUS
Status: DISPENSED
Start: 2025-02-24

## (undated) RX ORDER — ACETAMINOPHEN 325 MG/1
TABLET ORAL
Status: DISPENSED
Start: 2025-02-24

## (undated) RX ORDER — BUPIVACAINE HYDROCHLORIDE AND EPINEPHRINE 5; 5 MG/ML; UG/ML
INJECTION, SOLUTION EPIDURAL; INTRACAUDAL; PERINEURAL
Status: DISPENSED
Start: 2024-11-12

## (undated) RX ORDER — PROPOFOL 10 MG/ML
INJECTION, EMULSION INTRAVENOUS
Status: DISPENSED
Start: 2025-02-12

## (undated) RX ORDER — ONDANSETRON 2 MG/ML
INJECTION INTRAMUSCULAR; INTRAVENOUS
Status: DISPENSED
Start: 2025-02-24

## (undated) RX ORDER — FENTANYL CITRATE 50 UG/ML
INJECTION, SOLUTION INTRAMUSCULAR; INTRAVENOUS
Status: DISPENSED
Start: 2025-02-24

## (undated) RX ORDER — KETOROLAC TROMETHAMINE 30 MG/ML
INJECTION, SOLUTION INTRAMUSCULAR; INTRAVENOUS
Status: DISPENSED
Start: 2024-11-12

## (undated) RX ORDER — FENTANYL CITRATE-0.9 % NACL/PF 10 MCG/ML
PLASTIC BAG, INJECTION (ML) INTRAVENOUS
Status: DISPENSED
Start: 2025-02-12

## (undated) RX ORDER — FENTANYL CITRATE 50 UG/ML
INJECTION, SOLUTION INTRAMUSCULAR; INTRAVENOUS
Status: DISPENSED
Start: 2025-02-12

## (undated) RX ORDER — KETOROLAC TROMETHAMINE 30 MG/ML
INJECTION, SOLUTION INTRAMUSCULAR; INTRAVENOUS
Status: DISPENSED
Start: 2025-02-12